# Patient Record
Sex: MALE | Race: WHITE | Employment: FULL TIME | URBAN - METROPOLITAN AREA
[De-identification: names, ages, dates, MRNs, and addresses within clinical notes are randomized per-mention and may not be internally consistent; named-entity substitution may affect disease eponyms.]

---

## 2020-06-15 ENCOUNTER — HOSPITAL ENCOUNTER (EMERGENCY)
Age: 35
Discharge: HOME | End: 2020-06-15
Payer: MEDICAID

## 2020-06-15 ENCOUNTER — APPOINTMENT (OUTPATIENT)
Dept: GENERAL RADIOLOGY | Age: 35
End: 2020-06-15
Payer: MEDICAID

## 2020-06-15 VITALS
OXYGEN SATURATION: 97 % | BODY MASS INDEX: 42.14 KG/M2 | HEART RATE: 92 BPM | SYSTOLIC BLOOD PRESSURE: 144 MMHG | HEIGHT: 71 IN | RESPIRATION RATE: 16 BRPM | TEMPERATURE: 98 F | DIASTOLIC BLOOD PRESSURE: 70 MMHG | WEIGHT: 301 LBS

## 2020-06-15 PROCEDURE — 74018 RADEX ABDOMEN 1 VIEW: CPT

## 2020-06-15 PROCEDURE — 99212 OFFICE O/P EST SF 10 MIN: CPT

## 2020-06-15 ASSESSMENT — PAIN DESCRIPTION - PAIN TYPE
TYPE: ACUTE PAIN
TYPE: ACUTE PAIN

## 2020-06-15 ASSESSMENT — PAIN DESCRIPTION - ORIENTATION
ORIENTATION: LEFT;MID
ORIENTATION: LEFT

## 2020-06-15 ASSESSMENT — PAIN DESCRIPTION - ONSET
ONSET: ON-GOING
ONSET: ON-GOING

## 2020-06-15 ASSESSMENT — PAIN DESCRIPTION - DESCRIPTORS
DESCRIPTORS: ACHING;CRAMPING
DESCRIPTORS: ACHING

## 2020-06-15 ASSESSMENT — PAIN SCALES - GENERAL
PAINLEVEL_OUTOF10: 5
PAINLEVEL_OUTOF10: 4

## 2020-06-15 ASSESSMENT — PAIN DESCRIPTION - PROGRESSION
CLINICAL_PROGRESSION: NOT CHANGED
CLINICAL_PROGRESSION: GRADUALLY IMPROVING

## 2020-06-15 ASSESSMENT — PAIN - FUNCTIONAL ASSESSMENT: PAIN_FUNCTIONAL_ASSESSMENT: 0-10

## 2020-06-15 ASSESSMENT — PAIN DESCRIPTION - FREQUENCY
FREQUENCY: INTERMITTENT
FREQUENCY: CONTINUOUS

## 2020-06-15 ASSESSMENT — PAIN DESCRIPTION - LOCATION
LOCATION: ABDOMEN;FLANK
LOCATION: ABDOMEN;FLANK

## 2021-12-27 ENCOUNTER — HOSPITAL ENCOUNTER (EMERGENCY)
Age: 36
Discharge: HOME | End: 2021-12-27
Payer: MEDICAID

## 2021-12-27 VITALS
HEART RATE: 91 BPM | WEIGHT: 315 LBS | DIASTOLIC BLOOD PRESSURE: 92 MMHG | TEMPERATURE: 97 F | OXYGEN SATURATION: 96 % | SYSTOLIC BLOOD PRESSURE: 134 MMHG | BODY MASS INDEX: 44.1 KG/M2 | RESPIRATION RATE: 20 BRPM | HEIGHT: 71 IN

## 2021-12-27 DIAGNOSIS — Z20.822 EXPOSURE TO COVID-19 VIRUS: ICD-10-CM

## 2021-12-27 DIAGNOSIS — J06.9 VIRAL URI: Primary | ICD-10-CM

## 2021-12-27 PROCEDURE — 99211 OFF/OP EST MAY X REQ PHY/QHP: CPT

## 2021-12-27 RX ORDER — GUAIFENESIN 600 MG/1
600 TABLET, EXTENDED RELEASE ORAL 2 TIMES DAILY
Qty: 10 TABLET | Refills: 0 | Status: SHIPPED | OUTPATIENT
Start: 2021-12-27 | End: 2022-01-01

## 2021-12-27 NOTE — Clinical Note
Mahesh Montanez was seen and treated in our emergency department on 12/27/2021. He may return to work on 12/31/2021. If Covid test is positive the patient will need to quarantine for 10 daysIf Covid test is negative patient may return to work December 31, 2021     If you have any questions or concerns, please don't hesitate to call.       Gino Molina, [address]

## 2021-12-27 NOTE — ED PROVIDER NOTES
[facility]  Department of Emergency Medicine   ED  Encounter Note  Admit Date/RoomTime: 2021  6:19 PM  ED Room:   NAME: Alex Cason  : 1985  MRN: [id number]     Chief Complaint:  Concern For COVID-19 (started friday with sinus pain was exposed to people at work who had covid) and Sinusitis    HISTORY OF PRESENT ILLNESS        Alex Cason is a 31 y.o. male who presents to the ED with a concern for Covid. Patient states he works at [employer]. They have had seven in patients that have been positive for Covid and he was exposed. Patient complains of not feeling well for the past 3 to 4 days. Patient complains of body aches. Sinus congestion. Cough. Patient denies fever. Patient denies loss of taste or loss of smell. Patient denies shortness of breath. Denies nausea, vomiting or diarrhea. Symptoms are moderate in severity. He is not taking any over-the-counter medications. ROS   Pertinent positives and negatives are stated within HPI, all other systems reviewed and are negative. Past Medical History:  has a past medical history of Atrial fibrillation ([facility] [place] [address] Surgical History:  has no past surgical history on file. Social History:  reports that he has never smoked. His smokeless tobacco use includes chew. He reports previous alcohol use. He reports previous drug use. Drug: Opiates . Family History: family history is not on file. Allergies: Bee venom    PHYSICAL EXAM   Oxygen Saturation Interpretation: Normal on room air analysis. ED Triage Vitals [21 1821]   BP Temp Temp Source Pulse Resp SpO2 Height Weight   (!) 134/92 97 °F (36.1 °C) Infrared 91 20 96 % 5' 11\" (1.803 m) (!) 317 lb (143.8 kg)       General:  NAD. Alert and Oriented. Well-appearing. Skin:  Warm, dry. No rashes. Head:  Normocephalic. Atraumatic. Eyes:  EOMI. Conjunctiva normal.  ENT:  Oral mucosa moist.  Airway patent.   Posterior pharynx pink without erythema, without swelling, without exudate. Uvula midline with equal rise and without edema. Clear postnasal drainage. Bilateral ear canals patent with minimal cerumen. Bilateral TM's without erythema, without bulging. Nasal turbinates with moderate swelling. Frontal and maxillary sinuses nontender to palpation. Neck:  Supple. Normal ROM. Respiratory:  No respiratory distress. No labored breathing. Lungs clear without rales, rhonchi or wheezing. Cardiovascular: Irregular heartbeat. No Murmur. No peripheral edema. Extremities warm and good color. Extremities:  Normal ROM. Nontender to palpation. Atraumatic. Back:  Normal ROM. Nontender to palpation. Neuro:  Alert and Oriented to person, place, time and situation. Normal LOC. Moves all extremities. Speech fluent. Psych:  Calm and Cooperative. Normal thought process. Normal judgement. Lab / Imaging Results   (All laboratory and radiology results have been personally reviewed by myself)  Labs:  No results found for this visit on 12/27/21. Imaging: All Radiology results interpreted by Radiologist unless otherwise noted. No orders to display       ED Course / Medical Decision Making   Medications - No data to display     Re-examination:  12/27/21       Time: On auscultation patient has an irregular heart beat. Does tell me he has A. fib. He is not on any medication for it. I told him he was crazy and that that was ridiculous. That he definitely needs to see his family doctor and get on an anticoagulant and rate control medication. Before he had a stroke. Consult(s):   None    Procedure(s):   none    MDM:   Did offer to do an outpatient Covid test on patient. He states he will just go to the rapid clinic tomorrow. With his history of A. fib, untreated, I advised him he should not take any medication with pseudoephedrine in it. I will write for a plain decongestant.   He understands if he has any worsening symptoms he should go to the emergency room. Plan of Care/Counseling:  Physician Assistant on duty reviewed today's visit with the patient in addition to providing specific details for the plan of care and counseling regarding the diagnosis and prognosis. Questions are answered at this time and are agreeable with the plan. ASSESSMENT     1. Viral URI New Problem   2. Exposure to COVID-19 virus New Problem     PLAN   Discharged home. Patient condition is good    New Medications     New Prescriptions    GUAIFENESIN (MUCINEX) 600 MG EXTENDED RELEASE TABLET    Take 1 tablet by mouth 2 times daily for 5 days     Electronically signed by SHA Wen   DD: 12/27/21  **This report was transcribed using voice recognition software. Every effort was made to ensure accuracy; however, inadvertent computerized transcription errors may be present.   END OF ED PROVIDER NOTE       Susana Wen  12/27/21 [id number]

## 2022-03-27 ENCOUNTER — APPOINTMENT (OUTPATIENT)
Dept: CT IMAGING | Age: 37
DRG: 383 | End: 2022-03-27
Payer: MEDICAID

## 2022-03-27 ENCOUNTER — HOSPITAL ENCOUNTER (INPATIENT)
Age: 37
LOS: 2 days | Discharge: HOME | DRG: 383 | End: 2022-03-29
Attending: EMERGENCY MEDICINE | Admitting: INTERNAL MEDICINE
Payer: MEDICAID

## 2022-03-27 ENCOUNTER — HOSPITAL ENCOUNTER (EMERGENCY)
Age: 37
Discharge: HOME | End: 2022-03-27
Payer: MEDICAID

## 2022-03-27 VITALS
OXYGEN SATURATION: 98 % | SYSTOLIC BLOOD PRESSURE: 157 MMHG | WEIGHT: 307 LBS | BODY MASS INDEX: 42.98 KG/M2 | HEIGHT: 71 IN | HEART RATE: 100 BPM | TEMPERATURE: 97.3 F | DIASTOLIC BLOOD PRESSURE: 98 MMHG | RESPIRATION RATE: 18 BRPM

## 2022-03-27 DIAGNOSIS — L02.01 FACIAL ABSCESS: Primary | ICD-10-CM

## 2022-03-27 DIAGNOSIS — L03.213 PRESEPTAL CELLULITIS: ICD-10-CM

## 2022-03-27 DIAGNOSIS — E66.01 CLASS 3 SEVERE OBESITY DUE TO EXCESS CALORIES WITH SERIOUS COMORBIDITY AND BODY MASS INDEX (BMI) OF 40.0 TO 44.9 IN ADULT (HCC): Primary | ICD-10-CM

## 2022-03-27 PROBLEM — F11.10 NARCOTIC ABUSE (HCC): Status: ACTIVE | Noted: 2022-03-27

## 2022-03-27 PROBLEM — G47.33 OSA (OBSTRUCTIVE SLEEP APNEA): Status: ACTIVE | Noted: 2022-03-27

## 2022-03-27 PROBLEM — L03.211 FACIAL CELLULITIS: Status: ACTIVE | Noted: 2022-03-27

## 2022-03-27 PROBLEM — F10.10 ALCOHOL ABUSE: Status: ACTIVE | Noted: 2022-03-27

## 2022-03-27 PROBLEM — K08.9 POOR DENTITION: Status: ACTIVE | Noted: 2022-03-27

## 2022-03-27 PROBLEM — I10 HTN (HYPERTENSION): Status: ACTIVE | Noted: 2022-03-27

## 2022-03-27 PROBLEM — R22.0 FACIAL SWELLING: Status: ACTIVE | Noted: 2022-03-27

## 2022-03-27 PROBLEM — E66.813 CLASS 3 SEVERE OBESITY DUE TO EXCESS CALORIES IN ADULT (HCC): Status: ACTIVE | Noted: 2022-03-27

## 2022-03-27 PROBLEM — I48.91 A-FIB (HCC): Status: ACTIVE | Noted: 2022-03-27

## 2022-03-27 PROBLEM — Z72.0 CHEWS TOBACCO REGULARLY: Status: ACTIVE | Noted: 2022-03-27

## 2022-03-27 LAB
ANION GAP SERPL CALCULATED.3IONS-SCNC: 15 MMOL/L (ref 7–16)
BASOPHILS ABSOLUTE: 0.01 E9/L (ref 0–0.2)
BASOPHILS RELATIVE PERCENT: 0.1 % (ref 0–2)
BUN BLDV-MCNC: 10 MG/DL (ref 6–20)
CALCIUM SERPL-MCNC: 9.3 MG/DL (ref 8.6–10.2)
CHLORIDE BLD-SCNC: 104 MMOL/L (ref 98–107)
CO2: 23 MMOL/L (ref 22–29)
CREAT SERPL-MCNC: 0.7 MG/DL (ref 0.7–1.2)
EOSINOPHILS ABSOLUTE: 0 E9/L (ref 0.05–0.5)
EOSINOPHILS RELATIVE PERCENT: 0 % (ref 0–6)
GFR AFRICAN AMERICAN: >60
GFR NON-AFRICAN AMERICAN: >60 ML/MIN/1.73
GLUCOSE BLD-MCNC: 116 MG/DL (ref 74–99)
HCT VFR BLD CALC: 42.5 % (ref 37–54)
HEMOGLOBIN: 14.5 G/DL (ref 12.5–16.5)
IMMATURE GRANULOCYTES #: 0.06 E9/L
IMMATURE GRANULOCYTES %: 0.5 % (ref 0–5)
LACTIC ACID: 1.3 MMOL/L (ref 0.5–2.2)
LYMPHOCYTES ABSOLUTE: 1.97 E9/L (ref 1.5–4)
LYMPHOCYTES RELATIVE PERCENT: 15.7 % (ref 20–42)
MCH RBC QN AUTO: 30.9 PG (ref 26–35)
MCHC RBC AUTO-ENTMCNC: 34.1 % (ref 32–34.5)
MCV RBC AUTO: 90.4 FL (ref 80–99.9)
MONOCYTES ABSOLUTE: 1.01 E9/L (ref 0.1–0.95)
MONOCYTES RELATIVE PERCENT: 8.1 % (ref 2–12)
NEUTROPHILS ABSOLUTE: 9.48 E9/L (ref 1.8–7.3)
NEUTROPHILS RELATIVE PERCENT: 75.6 % (ref 43–80)
PDW BLD-RTO: 13.2 FL (ref 11.5–15)
PLATELET # BLD: 269 E9/L (ref 130–450)
PMV BLD AUTO: 9.9 FL (ref 7–12)
POTASSIUM REFLEX MAGNESIUM: 3.8 MMOL/L (ref 3.5–5)
RBC # BLD: 4.7 E12/L (ref 3.8–5.8)
SODIUM BLD-SCNC: 142 MMOL/L (ref 132–146)
WBC # BLD: 12.5 E9/L (ref 4.5–11.5)

## 2022-03-27 PROCEDURE — 99211 OFF/OP EST MAY X REQ PHY/QHP: CPT

## 2022-03-27 PROCEDURE — 2580000003 HC RX 258: Performed by: INTERNAL MEDICINE

## 2022-03-27 PROCEDURE — 96365 THER/PROPH/DIAG IV INF INIT: CPT

## 2022-03-27 PROCEDURE — 6370000000 HC RX 637 (ALT 250 FOR IP): Performed by: INTERNAL MEDICINE

## 2022-03-27 PROCEDURE — 83605 ASSAY OF LACTIC ACID: CPT

## 2022-03-27 PROCEDURE — 87040 BLOOD CULTURE FOR BACTERIA: CPT

## 2022-03-27 PROCEDURE — 6360000004 HC RX CONTRAST MEDICATION: Performed by: STUDENT IN AN ORGANIZED HEALTH CARE EDUCATION/TRAINING PROGRAM

## 2022-03-27 PROCEDURE — 36415 COLL VENOUS BLD VENIPUNCTURE: CPT

## 2022-03-27 PROCEDURE — 87081 CULTURE SCREEN ONLY: CPT

## 2022-03-27 PROCEDURE — 1200000000 HC SEMI PRIVATE

## 2022-03-27 PROCEDURE — 99285 EMERGENCY DEPT VISIT HI MDM: CPT

## 2022-03-27 PROCEDURE — 80048 BASIC METABOLIC PNL TOTAL CA: CPT

## 2022-03-27 PROCEDURE — 2580000003 HC RX 258: Performed by: STUDENT IN AN ORGANIZED HEALTH CARE EDUCATION/TRAINING PROGRAM

## 2022-03-27 PROCEDURE — 6360000002 HC RX W HCPCS: Performed by: INTERNAL MEDICINE

## 2022-03-27 PROCEDURE — 2580000003 HC RX 258: Performed by: PHYSICIAN ASSISTANT

## 2022-03-27 PROCEDURE — 70487 CT MAXILLOFACIAL W/DYE: CPT

## 2022-03-27 PROCEDURE — 6360000002 HC RX W HCPCS: Performed by: PHYSICIAN ASSISTANT

## 2022-03-27 PROCEDURE — 96375 TX/PRO/DX INJ NEW DRUG ADDON: CPT

## 2022-03-27 PROCEDURE — 85025 COMPLETE CBC W/AUTO DIFF WBC: CPT

## 2022-03-27 RX ORDER — SODIUM CHLORIDE 0.9 % (FLUSH) 0.9 %
5-40 SYRINGE (ML) INJECTION EVERY 12 HOURS SCHEDULED
Status: DISCONTINUED | OUTPATIENT
Start: 2022-03-27 | End: 2022-03-29 | Stop reason: HOSPADM

## 2022-03-27 RX ORDER — ACETAMINOPHEN 500 MG
1000 TABLET ORAL 4 TIMES DAILY
Status: DISCONTINUED | OUTPATIENT
Start: 2022-03-27 | End: 2022-03-29 | Stop reason: HOSPADM

## 2022-03-27 RX ORDER — ASPIRIN 81 MG/1
81 TABLET ORAL DAILY
Status: DISCONTINUED | OUTPATIENT
Start: 2022-03-27 | End: 2022-03-29 | Stop reason: HOSPADM

## 2022-03-27 RX ORDER — DEXAMETHASONE SODIUM PHOSPHATE 10 MG/ML
10 INJECTION INTRAMUSCULAR; INTRAVENOUS ONCE
Status: COMPLETED | OUTPATIENT
Start: 2022-03-27 | End: 2022-03-27

## 2022-03-27 RX ORDER — FAMOTIDINE 20 MG/1
20 TABLET, FILM COATED ORAL 2 TIMES DAILY
Status: DISCONTINUED | OUTPATIENT
Start: 2022-03-27 | End: 2022-03-29 | Stop reason: HOSPADM

## 2022-03-27 RX ORDER — CARVEDILOL 6.25 MG/1
6.25 TABLET ORAL 2 TIMES DAILY WITH MEALS
Status: DISCONTINUED | OUTPATIENT
Start: 2022-03-27 | End: 2022-03-28

## 2022-03-27 RX ORDER — DEXAMETHASONE SODIUM PHOSPHATE 4 MG/ML
4 INJECTION, SOLUTION INTRA-ARTICULAR; INTRALESIONAL; INTRAMUSCULAR; INTRAVENOUS; SOFT TISSUE EVERY 6 HOURS
Status: DISCONTINUED | OUTPATIENT
Start: 2022-03-27 | End: 2022-03-29 | Stop reason: HOSPADM

## 2022-03-27 RX ORDER — ONDANSETRON 4 MG/1
4 TABLET, ORALLY DISINTEGRATING ORAL EVERY 8 HOURS PRN
Status: DISCONTINUED | OUTPATIENT
Start: 2022-03-27 | End: 2022-03-29 | Stop reason: HOSPADM

## 2022-03-27 RX ORDER — SODIUM CHLORIDE 9 MG/ML
25 INJECTION, SOLUTION INTRAVENOUS PRN
Status: DISCONTINUED | OUTPATIENT
Start: 2022-03-27 | End: 2022-03-29 | Stop reason: HOSPADM

## 2022-03-27 RX ORDER — SODIUM CHLORIDE 0.9 % (FLUSH) 0.9 %
10 SYRINGE (ML) INJECTION
Status: COMPLETED | OUTPATIENT
Start: 2022-03-27 | End: 2022-03-27

## 2022-03-27 RX ORDER — LACTOBACILLUS RHAMNOSUS GG 10B CELL
1 CAPSULE ORAL 2 TIMES DAILY
Status: DISCONTINUED | OUTPATIENT
Start: 2022-03-27 | End: 2022-03-29 | Stop reason: HOSPADM

## 2022-03-27 RX ORDER — SODIUM CHLORIDE 0.9 % (FLUSH) 0.9 %
5-40 SYRINGE (ML) INJECTION PRN
Status: DISCONTINUED | OUTPATIENT
Start: 2022-03-27 | End: 2022-03-29 | Stop reason: HOSPADM

## 2022-03-27 RX ORDER — 0.9 % SODIUM CHLORIDE 0.9 %
1000 INTRAVENOUS SOLUTION INTRAVENOUS ONCE
Status: COMPLETED | OUTPATIENT
Start: 2022-03-27 | End: 2022-03-27

## 2022-03-27 RX ORDER — CLONIDINE HYDROCHLORIDE 0.1 MG/1
0.1 TABLET ORAL EVERY 4 HOURS PRN
Status: DISCONTINUED | OUTPATIENT
Start: 2022-03-27 | End: 2022-03-29 | Stop reason: HOSPADM

## 2022-03-27 RX ORDER — ONDANSETRON 2 MG/ML
4 INJECTION INTRAMUSCULAR; INTRAVENOUS EVERY 6 HOURS PRN
Status: DISCONTINUED | OUTPATIENT
Start: 2022-03-27 | End: 2022-03-29 | Stop reason: HOSPADM

## 2022-03-27 RX ADMIN — Medication 10 ML: at 12:00

## 2022-03-27 RX ADMIN — ASPIRIN 81 MG: 81 TABLET, COATED ORAL at 14:42

## 2022-03-27 RX ADMIN — Medication 1 CAPSULE: at 22:30

## 2022-03-27 RX ADMIN — SODIUM CHLORIDE 1000 ML: 9 INJECTION, SOLUTION INTRAVENOUS at 10:50

## 2022-03-27 RX ADMIN — DEXAMETHASONE SODIUM PHOSPHATE 4 MG: 4 INJECTION, SOLUTION INTRAMUSCULAR; INTRAVENOUS at 22:30

## 2022-03-27 RX ADMIN — SODIUM CHLORIDE 3000 MG: 900 INJECTION INTRAVENOUS at 10:49

## 2022-03-27 RX ADMIN — Medication 10 ML: at 22:32

## 2022-03-27 RX ADMIN — ENOXAPARIN SODIUM 30 MG: 100 INJECTION SUBCUTANEOUS at 14:55

## 2022-03-27 RX ADMIN — SODIUM CHLORIDE 3000 MG: 900 INJECTION INTRAVENOUS at 19:28

## 2022-03-27 RX ADMIN — VANCOMYCIN HYDROCHLORIDE 2750 MG: 1 INJECTION, POWDER, LYOPHILIZED, FOR SOLUTION INTRAVENOUS at 14:42

## 2022-03-27 RX ADMIN — ACETAMINOPHEN 1000 MG: 500 TABLET ORAL at 17:56

## 2022-03-27 RX ADMIN — FAMOTIDINE 20 MG: 20 TABLET ORAL at 22:30

## 2022-03-27 RX ADMIN — DEXAMETHASONE SODIUM PHOSPHATE 4 MG: 4 INJECTION, SOLUTION INTRAMUSCULAR; INTRAVENOUS at 14:43

## 2022-03-27 RX ADMIN — DEXAMETHASONE SODIUM PHOSPHATE 10 MG: 10 INJECTION INTRAMUSCULAR; INTRAVENOUS at 10:49

## 2022-03-27 RX ADMIN — IOPAMIDOL 90 ML: 755 INJECTION, SOLUTION INTRAVENOUS at 12:00

## 2022-03-27 RX ADMIN — CARVEDILOL 6.25 MG: 6.25 TABLET, FILM COATED ORAL at 17:56

## 2022-03-27 RX ADMIN — SODIUM CHLORIDE 3000 MG: 900 INJECTION INTRAVENOUS at 22:31

## 2022-03-27 RX ADMIN — FAMOTIDINE 20 MG: 20 TABLET ORAL at 14:55

## 2022-03-27 RX ADMIN — Medication 1 CAPSULE: at 14:42

## 2022-03-27 RX ADMIN — ACETAMINOPHEN 1000 MG: 500 TABLET ORAL at 22:30

## 2022-03-27 ASSESSMENT — PAIN SCALES - GENERAL
PAINLEVEL_OUTOF10: 2
PAINLEVEL_OUTOF10: 0
PAINLEVEL_OUTOF10: 3
PAINLEVEL_OUTOF10: 5

## 2022-03-27 ASSESSMENT — ENCOUNTER SYMPTOMS
CHEST TIGHTNESS: 0
COUGH: 0
FACIAL SWELLING: 1
TROUBLE SWALLOWING: 0
SHORTNESS OF BREATH: 0
COLOR CHANGE: 0
SORE THROAT: 0

## 2022-03-27 ASSESSMENT — PAIN DESCRIPTION - LOCATION: LOCATION: TEETH

## 2022-03-27 NOTE — PROGRESS NOTES
Dr. Sommer Pettit MD,    Your patient is on a medication that requires a renal and/or weight dose adjustment. Renal/Body Weight Function Assessment:    Date Body Weight IBW  Adjusted BW SCr  CrCl Dialysis status   3/27/2022 139.3 kg  Ideal body weight: 75.3 kg (166 lb 0.1 oz)  Adjusted ideal body weight: 100.9 kg (222 lb 6.5 oz) Serum creatinine: 0.7 mg/dL 03/27/22 1022  Estimated creatinine clearance: 210 mL/min N/a       Pharmacy has dose-adjusted the following medication(s):    Date Previous Order Adjusted Order   3/27/2022 Enoxaparin 40 mg daily Enoxaparin 30 mg BID       These changes were made per protocol according to the [facility] Renal Dosing Policy/ [facility] Pharmacist Anticoagulant Review. *Please note this dose may need readjusted if your patient's condition changes. Please contact pharmacy with any questions regarding these changes.     [address], [facility]  3/27/2022  1:46 PM

## 2022-03-27 NOTE — H&P
PCP: Kamila Davila DO     Chief Complaint: facial cellulitis    HPI:   Pavan Condon is a 30y.o. year old White (non-) male with PMH HTN and afib not on meds presented to ED due to facial swelling. He has poor denition and in Jan/Feb he had some teeth pulled out. On [address] he started to feel some tooth pain and called and made an appointment with his dentist of April 4th, the first available appt. His tooth pain progressed and started to have drainage from his L eye and nose this morning along with facial swelling and could not see out of L eye and so he called his mother and she took him to [address] [facility] [address] [place] [address] urgent [facility] and then her was referred here to [facility] for further mx. CT head did shows extensive cellulitis but does not show any abscess  His labs essentially unremarklable      Past Medical History:   Past Medical History:   Diagnosis Date    Atrial fibrillation ([address]     Hypertension         Past Surgical History:   Teeth extraction    Family History:   History reviewed. No pertinent family history.      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\"     Sexual activity: Yes   Other Topics Concern    None   Social History Narrative    None     Social Determinants of Health     Financial Resource Strain:     Difficulty of Paying Living Expenses: Not on file   Food Insecurity:     Worried About Running Out of Food in the Last Year: Not on file    John of Food in the Last Year: Not on file   Transportation Needs:     Lack of Transportation (Medical): Not on file    Lack of Transportation (Non-Medical): Not on file   Physical Activity:     Days of Exercise per Week: Not on file    Minutes of Exercise per Session: Not on file   Stress:     Feeling of Stress : Not on file   Social Connections:     Frequency of Communication with Friends and Family: Not on file    Frequency of Social Gatherings with Friends and Family: Not on file    Attends Yarsanism Services: Not on file    Active Member of [address] [place] [address] [employer] or Organizations: Not on file    Attends Club or Organization Meetings: Not on file    Marital Status: Not on file   Intimate Partner Violence:     Fear of Current or Ex-Partner: Not on file    Emotionally Abused: Not on file    Physically Abused: Not on file    Sexually Abused: Not on file   Housing Stability:     Unable to Pay for Housing in the Last Year: Not on file    Number of [place] in the Last Year: Not on file    Unstable Housing in the Last Year: Not on file    lives with 15 y/o son    Home Medications:   Prior to Admission medications    Not on File        Allergies:    Allergies   Allergen Reactions    Bee Venom     Dilantin [Phenytoin] Hives        Review of Systems:  Constitutional - Normal Appetite, Stable weight, No Fever, Chills, weakness, Fatigue   HEENT - No HA, hearing changes, seasonal allergies   Respiratory - no cough, wheezing, asthma, hemoptysis   Cardiac - No Chest pain, SOB, palpitations, diaphoresis, [place], syncope, dizziness   GI - No Abdominal pain, nausea, vomiting, diarrhea, constipation    -No Urinary symptoms, dysuria, hematuria   Neurological - No dysphagia, dysarthria, numbness, weakness   Musculoskeletal - No joint pain or tenderness, muscle pain, edema   Skin - No rash, lesions   Psych - No anxiety, depression, stress, insomnia     Physical Exam:   BP (!) 166/91   Pulse 100   Temp 98.4 °F (36.9 °C) (Oral)   Resp 18   SpO2 96%      General appearance: comfortable, in NAD   Head: Normocephalic, with significant swelling over L orbit and L eyes hard to open and swelling over L mandible. Mouth: eroded dentition but no discharge or foul smell  Neck: Supple, no lymphadenopathy or thyromegaly, trachea midline   Lungs: CTA, no wheezing or crackles   Heart: Regular rate and rhythm, S1, S2 normal, no murmur, click, rub or gallop   Abdomen: Soft, non-tender and not-distended. Bowel sounds normal. No masses, no organomegaly   Extremities: Extremities normal, atraumatic, no cyanosis or edema   Skin: Skin color, texture, turgor normal. No rashes or lesions   Neurologic: AAOx3, CN I-XII intact ,muscle tone and power, normal sensation     I/O  No intake or output data in the 24 hours ending 03/27/22 1410    Labs:   Recent Labs     03/27/22  1022   WBC 12.5*   HGB 14.5   HCT 42.5          Recent Labs     03/27/22  1022      K 3.8      CO2 23   BUN 10   CREATININE 0.7   CALCIUM 9.3       No results for input(s): PROT, ALB, ALKPHOS, ALT, AST, BILITOT, AMYLASE, LIPASE in the last 72 hours. No results for input(s): INR in the last 72 hours. No results for input(s): [place] in the last 72 hours. Chronic labs:  No results found for: CHOL, TRIG, HDL, LDLCALC, TSH, PSA, INR, LABA1C    Radiology:  Imaging studies reviewed today.      Assessment/Plan:    Patient Active Problem List   Diagnosis    Facial swelling    Poor dentition    Facial cellulitis    HTN (hypertension)    [facility] [address]    MAYE (obstructive sleep apnea)    Class 3 severe obesity due to excess calories in adult [facility])    Remote history of Narcotic abuse ([address]    Chews tobacco regularly    Remote history of Alcohol abuse      Severe facial cellulitis 2/2 very poor dentition   Pt has appt with dentistry 4/4/22  MRSA scrn  IV Unasyn, vancomycin  Encouraged to sleep on his back  IV decadron  Pepcid  Diet; soft and small bites  ID input    Hx/o HTN  Pt has not been on BP meds for a long time. Hs been on Lisinopril in the past  Prn clonidine for now    Hx/o afib  Not on meds  Start ASA    Suspect MAYE  Ordered OP PSG    Morbid Obesity, class III, BMI 42    Chews tobacco since age 17 y/o  Counseled on cessation    Remote hx/o narcotic and alcohol abuse  Sober for >3 yrs    DVT px: Lovenox  Code status: Full code     Plan discharge home pend coarse    CT:  Impression   1. Findings consistent with extensive facial cellulitis over the maxilla,   extending across the midline but more to the left and extending up to the   level of the orbits with left orbital preseptal cellulitis. 2. No evidence of an abscess. 3. No evidence of intra orbital abscess or postseptal cellulitis. 4. Poor visualization of the supraglottic structures including the   epiglottis, valleculae, piriform sinuses and aryepiglottic folds.  The   significance of this is uncertain and may be artifactual.  The possibility of   inflammatory process extending to the supraglottic region cannot be excluded.        Gabbie Hernandez MD   03/27/22, 2:10 [id number] [address]

## 2022-03-27 NOTE — PROGRESS NOTES
Pharmacy Consultation Note  (Antibiotic Dosing and Monitoring)    Initial consult date: 3/27/22  Consulting physician/provider:Charlie  Drug: Vancomycin  Indication: Head and neck infection    Age/  Gender Height Weight IBW  Allergy Information   37 y.o./male         Ideal body weight: 75.3 kg (166 lb 0.1 oz)  Adjusted ideal body weight: 100.9 kg (222 lb 6.5 oz)   Bee venom and Dilantin [phenytoin]      Renal Function:  Recent Labs     03/27/22  1022   BUN 10   CREATININE 0.7     No intake or output data in the 24 hours ending 03/27/22 1406    Vancomycin Monitoring:  Trough:  No results for input(s): VANCOTROUGH in the last 72 hours. Random:  No results for input(s): VANCORANDOM in the last 72 hours. Vancomycin Administration Times:  Recent vancomycin administrations      No vancomycin IV orders with administrations found. Orders not given:          vancomycin (VANCOCIN) 2,750 mg in dextrose 5 % 600 mL IVPB    vancomycin (VANCOCIN) 2,000 mg in dextrose 5 % 500 mL IVPB                Assessment:  · Patient is a 30 y.o. male who has been initiated on vancomycin  · Estimated Creatinine Clearance: 210 mL/min (based on SCr of 0.7 mg/dL). · To dose vancomycin, pharmacy will be utilizing "[employer] calculation software for goal AUC/NANY 400-600 mg/L-hr    Plan:  · Received 2.75g x 1 dose in ED.  Will continue vancomycin 2g IV every 12 hours  · Will check vancomycin levels when appropriate  · Will continue to monitor renal function   · Clinical pharmacy to follow      Kierra Benites [facility] 3/27/2022 2:06 PM

## 2022-03-27 NOTE — ED PROVIDER NOTES
ED Attending shared visit  CC: No        Department of Emergency Medicine   ED  Provider Note  Admit Date/RoomTime: 3/27/2022 10:08 AM  ED Room: 17B/17B-17  HPI:  3/27/22, Time: 12:00 PM EDT      Patient is a 72-year-old male presenting to the emergency department with left-sided dental pain and significant swelling to the left side of his face. Patient states the dental pain has been going on for several days. The pain in his tooth actually improved last night but he woke up this morning with significant swelling to his cheek and around his eye. Patient states he is having hard time opening his left eye. He has an appointment in 1 week to have all of his upper teeth removed on the left side. Patient sent to ED from urgent care for further evaluation. He denies any drainage from the mouth, difficulty swallowing or breathing, fevers or chills, myalgias, nausea/vomiting. The history is provided by the patient. No  was used. REVIEW OF SYSTEMS:  Review of Systems   Constitutional: Negative for activity change, chills, fatigue and fever. HENT: Positive for dental problem and facial swelling. Negative for ear discharge, ear pain, mouth sores, sore throat and trouble swallowing. Respiratory: Negative for cough, chest tightness and shortness of breath. Cardiovascular: Negative for chest pain, palpitations and leg swelling. Musculoskeletal: Negative for myalgias, neck pain and neck stiffness. Skin: Negative for color change, pallor and rash. Neurological: Negative for dizziness, weakness, light-headedness and headaches. Psychiatric/Behavioral: Negative for agitation, behavioral problems and confusion.       Pertinent positives and negatives are stated within HPI, all other systems reviewed and are negative.      --------------------------------------------- PAST HISTORY ---------------------------------------------  Past Medical History:  has a past medical history of Atrial fibrillation ([address] and Hypertension. Past Surgical History:  has no past surgical history on file. Social History:  reports that he has never smoked. His smokeless tobacco use includes chew. He reports previous alcohol use. He reports previous drug use. Drug: Opiates . Family History: family history is not on file. The patients home medications have been reviewed.     Allergies: Bee venom and Dilantin [phenytoin]    -------------------------------------------------- RESULTS -------------------------------------------------  All laboratory and radiology results have been personally reviewed by myself   LABS:  Results for orders placed or performed during the hospital encounter of 03/27/22   CBC with Auto Differential   Result Value Ref Range    WBC 12.5 (H) 4.5 - 11.5 E9/L    RBC 4.70 3.80 - 5.80 E12/L    Hemoglobin 14.5 12.5 - 16.5 g/dL    Hematocrit 42.5 37.0 - 54.0 %    MCV 90.4 80.0 - 99.9 fL    MCH 30.9 26.0 - 35.0 pg    MCHC 34.1 32.0 - 34.5 %    RDW 13.2 11.5 - 15.0 fL    Platelets 761 582 - 719 E9/L    MPV 9.9 7.0 - 12.0 fL    Neutrophils % 75.6 43.0 - 80.0 %    Immature Granulocytes % 0.5 0.0 - 5.0 %    Lymphocytes % 15.7 (L) 20.0 - 42.0 %    Monocytes % 8.1 2.0 - 12.0 %    Eosinophils % 0.0 0.0 - 6.0 %    Basophils % 0.1 0.0 - 2.0 %    Neutrophils Absolute 9.48 (H) 1.80 - 7.30 E9/L    Immature Granulocytes # 0.06 E9/L    Lymphocytes Absolute 1.97 1.50 - 4.00 E9/L    Monocytes Absolute 1.01 (H) 0.10 - 0.95 E9/L    Eosinophils Absolute 0.00 (L) 0.05 - 0.50 E9/L    Basophils Absolute 0.01 0.00 - 0.20 C1/D   Basic Metabolic Panel w/ Reflex to MG   Result Value Ref Range    Sodium 142 132 - 146 mmol/L    Potassium reflex Magnesium 3.8 3.5 - 5.0 mmol/L    Chloride 104 98 - 107 mmol/L    CO2 23 22 - 29 mmol/L    Anion Gap 15 7 - 16 mmol/L    Glucose 116 (H) 74 - 99 mg/dL    BUN 10 6 - 20 mg/dL    CREATININE 0.7 0.7 - 1.2 mg/dL    GFR Non-African American >60 >=60 mL/min/1.73    GFR African American >60     Calcium 9.3 8.6 - 10.2 mg/dL   Lactic Acid   Result Value Ref Range    Lactic Acid 1.3 0.5 - 2.2 mmol/L       RADIOLOGY:  Interpreted by Radiologist.  [address]   Final Result   1. Findings consistent with extensive facial cellulitis over the maxilla,   extending across the midline but more to the left and extending up to the   level of the orbits with left orbital preseptal cellulitis. 2. No evidence of an abscess. 3. No evidence of intra orbital abscess or postseptal cellulitis. 4. Poor visualization of the supraglottic structures including the   epiglottis, valleculae, piriform sinuses and aryepiglottic folds. The   significance of this is uncertain and may be artifactual.  The possibility of   inflammatory process extending to the supraglottic region cannot be excluded. ------------------------- NURSING NOTES AND VITALS REVIEWED ---------------------------   The nursing notes within the ED encounter and vital signs as below have been reviewed. BP (!) 166/91   Pulse 100   Temp 98.4 °F (36.9 °C) (Oral)   Resp 18   SpO2 96%   Oxygen Saturation Interpretation: Normal      ---------------------------------------------------PHYSICAL EXAM--------------------------------------    Physical Exam  Vitals and nursing note reviewed. Constitutional:       General: He is not in acute distress. Appearance: Normal appearance. He is well-developed. HENT:      Head: Normocephalic and atraumatic. Mouth/Throat:      Mouth: Mucous membranes are moist.      Comments: Overall poor dentition with significant dental decay. No obvious periapical abscess on the left side. Tenderness and edema over the left maxillay area. No abscess palpated. No submental TTP or edema. Eyes:      Comments: Moderate edema and erythema around left eye with difficulty opening the eye. Cardiovascular:      Rate and Rhythm: Normal rate and regular rhythm.       Heart sounds: Normal heart sounds. No murmur heard. Pulmonary:      Effort: Pulmonary effort is normal. No respiratory distress. Breath sounds: Normal breath sounds. Musculoskeletal:         General: No swelling, tenderness or deformity. Normal range of motion. Cervical back: Normal range of motion and neck supple. No rigidity. Skin:     General: Skin is warm and dry. Capillary Refill: Capillary refill takes less than 2 seconds. Findings: No erythema. Neurological:      General: No focal deficit present. Mental Status: He is alert and oriented to person, place, and time. Mental status is at baseline. Coordination: Coordination normal.   Psychiatric:         Mood and Affect: Mood normal.         Behavior: Behavior normal.         Thought Content:  Thought content normal.            ------------------------------ ED COURSE/MEDICAL DECISION MAKING----------------------  Medications   vancomycin (VANCOCIN) 2,750 mg in dextrose 5 % 600 mL IVPB (has no administration in time range)   ampicillin-sulbactam (UNASYN) 3000 mg ivpb minibag (has no administration in time range)   lactobacillus (CULTURELLE) capsule 1 capsule (has no administration in time range)   acetaminophen (TYLENOL) tablet 1,000 mg (has no administration in time range)   ondansetron (ZOFRAN) injection 4 mg (has no administration in time range)   ondansetron (ZOFRAN-ODT) disintegrating tablet 4 mg (has no administration in time range)   sodium chloride flush 0.9 % injection 5-40 mL (has no administration in time range)   sodium chloride flush 0.9 % injection 5-40 mL (has no administration in time range)   0.9 % sodium chloride infusion (has no administration in time range)   enoxaparin (LOVENOX) injection 30 mg (has no administration in time range)   sennosides (SENOKOT) 8.8 MG/5ML syrup 5 mL (has no administration in time range)   cloNIDine (CATAPRES) tablet 0.1 mg (has no administration in time range)   aspirin EC tablet 81 mg (has no administration in time range)   dexamethasone (DECADRON) injection 4 mg (has no administration in time range)   famotidine (PEPCID) tablet 20 mg (has no administration in time range)   vancomycin (VANCOCIN) 2,000 mg in dextrose 5 % 500 mL IVPB (has no administration in time range)   0.9 % sodium chloride bolus (1,000 mLs IntraVENous New Bag 3/27/22 1050)   dexamethasone (DECADRON) injection 10 mg (10 mg IntraVENous Given 3/27/22 1049)   ampicillin-sulbactam (UNASYN) 3000 mg ivpb minibag (0 mg IntraVENous Stopped 3/27/22 1129)   sodium chloride flush 0.9 % injection 10 mL (10 mLs IntraVENous Given 3/27/22 1200)   iopamidol (ISOVUE-370) 76 % injection 90 mL (90 mLs IntraVENous Given 3/27/22 1200)         ED COURSE:     CT FACIAL BONES W CONTRAST   Final Result   1. Findings consistent with extensive facial cellulitis over the maxilla,   extending across the midline but more to the left and extending up to the   level of the orbits with left orbital preseptal cellulitis. 2. No evidence of an abscess. 3. No evidence of intra orbital abscess or postseptal cellulitis. 4. Poor visualization of the supraglottic structures including the   epiglottis, valleculae, piriform sinuses and aryepiglottic folds. The   significance of this is uncertain and may be artifactual.  The possibility of   inflammatory process extending to the supraglottic region cannot be excluded. Procedures:  Procedures     Medical Decision Making:   MDM   72-year-old male presenting to the ED with dental pain, abscess and significant facial swelling. Patient appears to have very poor dentition but no obvious signs of any dental abscess. He does have moderate facial edema including around the eye consistent with preseptal/periorbital cellulitis. He has a mild leukocytosis of 12.5. Labs otherwise unremarkable. Blood cultures obtained and patient started on Decadron and Unasyn. CT does not show any drainable abscess.   He does have extensive cellulitis and edema over the maxilla and up to the orbit. Patient was initially given IV fluids, Decadron and Unasyn due to concerns for oral etiology. He is also given a dose of vancomycin for the underlying cellulitis. Patient will be admitted to [facility] physicians for further treatment and management. Counseling: The emergency provider has spoken with the patient and discussed todays results, in addition to providing specific details for the plan of care and counseling regarding the diagnosis and prognosis. Questions are answered at this time and they are agreeable with the plan.      --------------------------------- IMPRESSION AND DISPOSITION ---------------------------------    IMPRESSION  1. Class 3 severe obesity due to excess calories with serious comorbidity and body mass index (BMI) of 40.0 to 44.9 in adult [facility])    2. Preseptal cellulitis        DISPOSITION  Disposition: Admit to med/surg floor  Patient condition is good      Electronically signed by Pennie Bailon PA-C   DD: 3/27/22  **This report was transcribed using voice recognition software. Every effort was made to ensure accuracy; however, inadvertent computerized transcription errors may be present. END OF ED PROVIDER NOTE          Pennie Bailon PA-C  03/27/22 3687    ATTENDING PROVIDER ATTESTATION:     Ila Galindo presented to the emergency department for evaluation of Abscess (dental abscess overnight. Was sent here from urgent care in [facility]. )    I have reviewed and discussed the case, including pertinent history (medical, surgical, family and social) and exam findings with the Midlevel and the Nurse assigned to Ila Galindo. I have personally performed and/or participated in the history, exam, medical decision making, and procedures and agree with all pertinent clinical information.        I have reviewed my findings and recommendations with Ila Galindo and members of family present at the time of disposition. My findings/plan: The primary encounter diagnosis was Class 3 severe obesity due to excess calories with serious comorbidity and body mass index (BMI) of 40.0 to 44.9 in [facility]). A diagnosis of Preseptal cellulitis was also pertinent to this visit.   Current Discharge Medication List        Bryce Herring MD    Critical Care:  No       Bryce Herring MD  03/29/22 0373

## 2022-03-27 NOTE — ED NOTES
Patient ambulated to restroom, no complaints from patient, tolerated well. Patient denied any dizziness or shortness of breath.      Johann Thomas RN  03/27/22 9717

## 2022-03-27 NOTE — ED PROVIDER NOTES
Department of Emergency Medicine   [facility]  Provider Note  Admit Date/RoomTime: 3/27/2022  8:54 AM  Room:       NAME: Corie Conway  : 1985  MRN: [id number]     Chief Complaint:  Facial Swelling (left side   infected tooth left side upper front  eye swollen shout and redden started wed tooth started hurting   )    History of Present Illness        Corie Conway is a 30 y.o. old male who presents to the [facility] by private vehicle, for non-traumatic left upper canine and premolar pain, which occured 5 day(s) prior to arrival.  Since onset the symptoms have been worsening and moderate to severe in severity. Worsened by  heat and cold and improved by nothing. Associated Signs & Symptoms:  Facial swelling, redness and eye swelling. Onset:       Spontaneous:   yes. Following Trauma:   no.     Previous Caries:   yes. Recent Dental Procedure:   no.     ROS   Pertinent positives and negatives are stated within HPI, all other systems reviewed and are negative. Past Medical History:  has a past medical history of Atrial fibrillation ([facility] [address] Surgical History:  has no past surgical history on file. Social History:  reports that he has never smoked. His smokeless tobacco use includes chew. He reports previous alcohol use. He reports previous drug use. Drug: Opiates . Family History: family history is not on file. Allergies: Bee venom    Physical Exam           ED Triage Vitals   BP Temp Temp src Pulse Resp SpO2 Height Weight   22 0909 22 0858 -- 22 0858 22 0858 22 0858 22 0858 22 0858   (!) 157/98 97.3 °F (36.3 °C)  100 18 98 % 5' 11\" (1.803 m) (!) 307 lb (139.3 kg)      Oxygen Saturation Interpretation: Normal.    Constitutional:  Alert, development consistent with age. HEENT:  NC/NT. Airway patent. Edema and erythema to upper eyelid with ptosis  Neck:  Supple. Normal ROM.   Lips:  upper red, swollen. Mouth:  leukoplakia. Dental:  Mod swelling and abscess along buccal gumline adjacent to teeth 11-13. Trismus: mild. Drooling: No.         Airway stridor: No.  Facial skin: left warmth, tenderness, swelling and erythematous. Respiratory:  Clear to auscultation and breath sounds equal.    CV: Regular rate and rhythm, normal heart sounds, without pathological murmurs, ectopy, gallops, or rubs. Integument:  No rashes, erythema or lesions present, unless noted elsewhere. .  Lymphatics: No lymphangitis or adenopathy noted. Neurological:  Oriented. Motor functions intact. Lab / Imaging Results   (All laboratory and radiology results have been personally reviewed by myself)  Labs:  No results found for this visit on 03/27/22. Imaging: All Radiology results interpreted by Radiologist unless otherwise noted. No orders to display     ED Course / Medical Decision Making   Medications - No data to display    Procedure(s):    none. Plan of Care/Counseling:  Plan to send patient directly to [facility] ER for further eval and treatment    Assessment      1. Facial abscess      Plan   Discharge to [facility] ER  Patient condition is stable    New Medications     New Prescriptions    No medications on file     Electronically signed by SHA Diaz   DD: 3/27/22  **This report was transcribed using voice recognition software. Every effort was made to ensure accuracy; however, inadvertent computerized transcription errors may be present.   Sarah OF ED PROVIDER NOTE       Shara Eppersonma  03/27/22 0318

## 2022-03-28 LAB
ALBUMIN SERPL-MCNC: 4.5 G/DL (ref 3.5–5.2)
ALP BLD-CCNC: 54 U/L (ref 40–129)
ALT SERPL-CCNC: 36 U/L (ref 0–40)
ANION GAP SERPL CALCULATED.3IONS-SCNC: 14 MMOL/L (ref 7–16)
AST SERPL-CCNC: 17 U/L (ref 0–39)
BASOPHILS ABSOLUTE: 0.01 E9/L (ref 0–0.2)
BASOPHILS RELATIVE PERCENT: 0.1 % (ref 0–2)
BILIRUB SERPL-MCNC: 0.5 MG/DL (ref 0–1.2)
BUN BLDV-MCNC: 11 MG/DL (ref 6–20)
CALCIUM SERPL-MCNC: 9.4 MG/DL (ref 8.6–10.2)
CHLORIDE BLD-SCNC: 101 MMOL/L (ref 98–107)
CO2: 24 MMOL/L (ref 22–29)
CREAT SERPL-MCNC: 0.7 MG/DL (ref 0.7–1.2)
EOSINOPHILS ABSOLUTE: 0 E9/L (ref 0.05–0.5)
EOSINOPHILS RELATIVE PERCENT: 0 % (ref 0–6)
FOLATE: 17 NG/ML (ref 4.8–24.2)
GFR AFRICAN AMERICAN: >60
GFR NON-AFRICAN AMERICAN: >60 ML/MIN/1.73
GLUCOSE BLD-MCNC: 202 MG/DL (ref 74–99)
HBA1C MFR BLD: 6 % (ref 4–5.6)
HCT VFR BLD CALC: 40.6 % (ref 37–54)
HEMOGLOBIN: 14 G/DL (ref 12.5–16.5)
IMMATURE GRANULOCYTES #: 0.11 E9/L
IMMATURE GRANULOCYTES %: 0.9 % (ref 0–5)
IRON SATURATION: 15 % (ref 20–55)
IRON: 46 MCG/DL (ref 59–158)
LYMPHOCYTES ABSOLUTE: 1.1 E9/L (ref 1.5–4)
LYMPHOCYTES RELATIVE PERCENT: 8.6 % (ref 20–42)
MAGNESIUM: 2.2 MG/DL (ref 1.6–2.6)
MCH RBC QN AUTO: 31.2 PG (ref 26–35)
MCHC RBC AUTO-ENTMCNC: 34.5 % (ref 32–34.5)
MCV RBC AUTO: 90.4 FL (ref 80–99.9)
METER GLUCOSE: 170 MG/DL (ref 74–99)
METER GLUCOSE: 175 MG/DL (ref 74–99)
METER GLUCOSE: 265 MG/DL (ref 74–99)
MONOCYTES ABSOLUTE: 0.5 E9/L (ref 0.1–0.95)
MONOCYTES RELATIVE PERCENT: 3.9 % (ref 2–12)
NEUTROPHILS ABSOLUTE: 11.14 E9/L (ref 1.8–7.3)
NEUTROPHILS RELATIVE PERCENT: 86.5 % (ref 43–80)
PDW BLD-RTO: 13.2 FL (ref 11.5–15)
PLATELET # BLD: 275 E9/L (ref 130–450)
PMV BLD AUTO: 10.1 FL (ref 7–12)
POTASSIUM SERPL-SCNC: 3.9 MMOL/L (ref 3.5–5)
RBC # BLD: 4.49 E12/L (ref 3.8–5.8)
SODIUM BLD-SCNC: 139 MMOL/L (ref 132–146)
TOTAL IRON BINDING CAPACITY: 312 MCG/DL (ref 250–450)
TOTAL PROTEIN: 7.6 G/DL (ref 6.4–8.3)
TSH SERPL DL<=0.05 MIU/L-ACNC: 0.41 UIU/ML (ref 0.27–4.2)
VITAMIN B-12: 859 PG/ML (ref 211–946)
VITAMIN D 25-HYDROXY: 19 NG/ML (ref 30–100)
WBC # BLD: 12.9 E9/L (ref 4.5–11.5)

## 2022-03-28 PROCEDURE — 84443 ASSAY THYROID STIM HORMONE: CPT

## 2022-03-28 PROCEDURE — 2580000003 HC RX 258: Performed by: INTERNAL MEDICINE

## 2022-03-28 PROCEDURE — 83036 HEMOGLOBIN GLYCOSYLATED A1C: CPT

## 2022-03-28 PROCEDURE — 36415 COLL VENOUS BLD VENIPUNCTURE: CPT

## 2022-03-28 PROCEDURE — 6360000002 HC RX W HCPCS: Performed by: INTERNAL MEDICINE

## 2022-03-28 PROCEDURE — 1200000000 HC SEMI PRIVATE

## 2022-03-28 PROCEDURE — 82962 GLUCOSE BLOOD TEST: CPT

## 2022-03-28 PROCEDURE — 83540 ASSAY OF IRON: CPT

## 2022-03-28 PROCEDURE — 80053 COMPREHEN METABOLIC PANEL: CPT

## 2022-03-28 PROCEDURE — 85025 COMPLETE CBC W/AUTO DIFF WBC: CPT

## 2022-03-28 PROCEDURE — 83550 IRON BINDING TEST: CPT

## 2022-03-28 PROCEDURE — 83735 ASSAY OF MAGNESIUM: CPT

## 2022-03-28 PROCEDURE — 6370000000 HC RX 637 (ALT 250 FOR IP): Performed by: INTERNAL MEDICINE

## 2022-03-28 PROCEDURE — 82607 VITAMIN B-12: CPT

## 2022-03-28 PROCEDURE — 82306 VITAMIN D 25 HYDROXY: CPT

## 2022-03-28 PROCEDURE — 82746 ASSAY OF FOLIC ACID SERUM: CPT

## 2022-03-28 RX ORDER — CARVEDILOL 6.25 MG/1
12.5 TABLET ORAL 2 TIMES DAILY WITH MEALS
Status: DISCONTINUED | OUTPATIENT
Start: 2022-03-28 | End: 2022-03-29 | Stop reason: HOSPADM

## 2022-03-28 RX ORDER — DEXTROSE MONOHYDRATE 25 G/50ML
12.5 INJECTION, SOLUTION INTRAVENOUS PRN
Status: DISCONTINUED | OUTPATIENT
Start: 2022-03-28 | End: 2022-03-29 | Stop reason: HOSPADM

## 2022-03-28 RX ORDER — DEXTROSE MONOHYDRATE 50 MG/ML
100 INJECTION, SOLUTION INTRAVENOUS PRN
Status: DISCONTINUED | OUTPATIENT
Start: 2022-03-28 | End: 2022-03-29 | Stop reason: HOSPADM

## 2022-03-28 RX ORDER — NICOTINE POLACRILEX 4 MG
15 LOZENGE BUCCAL PRN
Status: DISCONTINUED | OUTPATIENT
Start: 2022-03-28 | End: 2022-03-29 | Stop reason: HOSPADM

## 2022-03-28 RX ADMIN — SODIUM CHLORIDE 3000 MG: 900 INJECTION INTRAVENOUS at 10:16

## 2022-03-28 RX ADMIN — Medication 10 ML: at 09:55

## 2022-03-28 RX ADMIN — INSULIN LISPRO 1 UNITS: 100 INJECTION, SOLUTION INTRAVENOUS; SUBCUTANEOUS at 23:02

## 2022-03-28 RX ADMIN — DEXAMETHASONE SODIUM PHOSPHATE 4 MG: 4 INJECTION, SOLUTION INTRAMUSCULAR; INTRAVENOUS at 06:17

## 2022-03-28 RX ADMIN — ENOXAPARIN SODIUM 30 MG: 100 INJECTION SUBCUTANEOUS at 20:32

## 2022-03-28 RX ADMIN — DEXAMETHASONE SODIUM PHOSPHATE 4 MG: 4 INJECTION, SOLUTION INTRAMUSCULAR; INTRAVENOUS at 15:32

## 2022-03-28 RX ADMIN — DEXAMETHASONE SODIUM PHOSPHATE 4 MG: 4 INJECTION, SOLUTION INTRAMUSCULAR; INTRAVENOUS at 20:33

## 2022-03-28 RX ADMIN — ACETAMINOPHEN 1000 MG: 500 TABLET ORAL at 16:34

## 2022-03-28 RX ADMIN — CARVEDILOL 12.5 MG: 6.25 TABLET, FILM COATED ORAL at 08:40

## 2022-03-28 RX ADMIN — ASPIRIN 81 MG: 81 TABLET, COATED ORAL at 08:40

## 2022-03-28 RX ADMIN — INSULIN LISPRO 1 UNITS: 100 INJECTION, SOLUTION INTRAVENOUS; SUBCUTANEOUS at 16:35

## 2022-03-28 RX ADMIN — DEXAMETHASONE SODIUM PHOSPHATE 4 MG: 4 INJECTION, SOLUTION INTRAMUSCULAR; INTRAVENOUS at 08:41

## 2022-03-28 RX ADMIN — ENOXAPARIN SODIUM 30 MG: 100 INJECTION SUBCUTANEOUS at 06:16

## 2022-03-28 RX ADMIN — ACETAMINOPHEN 1000 MG: 500 TABLET ORAL at 08:41

## 2022-03-28 RX ADMIN — VANCOMYCIN HYDROCHLORIDE 2000 MG: 10 INJECTION, POWDER, LYOPHILIZED, FOR SOLUTION INTRAVENOUS at 03:30

## 2022-03-28 RX ADMIN — SODIUM CHLORIDE 3000 MG: 900 INJECTION INTRAVENOUS at 16:39

## 2022-03-28 RX ADMIN — FAMOTIDINE 20 MG: 20 TABLET ORAL at 08:41

## 2022-03-28 RX ADMIN — INSULIN LISPRO 3 UNITS: 100 INJECTION, SOLUTION INTRAVENOUS; SUBCUTANEOUS at 11:51

## 2022-03-28 RX ADMIN — SODIUM CHLORIDE 3000 MG: 900 INJECTION INTRAVENOUS at 06:17

## 2022-03-28 RX ADMIN — Medication 10 ML: at 20:36

## 2022-03-28 RX ADMIN — Medication 1 CAPSULE: at 20:32

## 2022-03-28 RX ADMIN — ACETAMINOPHEN 1000 MG: 500 TABLET ORAL at 23:02

## 2022-03-28 RX ADMIN — VANCOMYCIN HYDROCHLORIDE 2000 MG: 10 INJECTION, POWDER, LYOPHILIZED, FOR SOLUTION INTRAVENOUS at 11:50

## 2022-03-28 RX ADMIN — Medication 1 CAPSULE: at 08:40

## 2022-03-28 RX ADMIN — FAMOTIDINE 20 MG: 20 TABLET ORAL at 23:02

## 2022-03-28 RX ADMIN — CARVEDILOL 12.5 MG: 6.25 TABLET, FILM COATED ORAL at 16:35

## 2022-03-28 RX ADMIN — SODIUM CHLORIDE 3000 MG: 900 INJECTION INTRAVENOUS at 23:39

## 2022-03-28 ASSESSMENT — PAIN SCALES - GENERAL
PAINLEVEL_OUTOF10: 6
PAINLEVEL_OUTOF10: 5

## 2022-03-28 NOTE — CONSULTS
Department of Internal Medicine  Infectious Diseases   Consult Note      Reason for Consult:  Facial cellulitis     Requesting Physician:  Dr Keyur Campos:                This is a 30 yrs old male with hx of HTN, A fib, new dx of  DM presented to the ER with facial swelling, pain, left eyelid swelling for 3-4 days . He stated that he had bad teeth and was supposed to see dentist next month .  He denied fever, chills, dysphagia, or shortness of breath   WBC was 12.9 K , Facial CT scan - soft tissue swelling  over the maxilla,   extending across the midline but more to the left and extending up to the level of orbit He was started on vancomycin and unasyn       Past Medical History:    Past Medical History:   Diagnosis Date    Atrial fibrillation ([facility] [address]     Hypertension        Past Surgical History:      Knee surgeries     Current Medications:      Current Facility-Administered Medications   Medication Dose Route Frequency Provider Last Rate Last Admin    carvedilol (COREG) tablet 12.5 mg  12.5 mg Oral BID WC Cesar Thompson MD   12.5 mg at 03/28/22 0840    glucose (GLUTOSE) 40 % oral gel 15 g  15 g Oral PRN Cesar Thompson MD        dextrose 50 % IV solution  12.5 g IntraVENous PRN Cesar Thompson MD        glucagon (rDNA) injection 1 mg  1 mg IntraMUSCular PRN Cesar Thompson MD        dextrose 5 % solution  100 mL/hr IntraVENous PRN Cesar Thompson MD        insulin lispro (HUMALOG) injection vial 0-6 Units  0-6 Units SubCUTAneous TID WC Cesar Thompson MD   3 Units at 03/28/22 1151    insulin lispro (HUMALOG) injection vial 0-3 Units  0-3 Units SubCUTAneous Nightly Cesar Thompson MD        ampicillin-sulbactam (UNASYN) 3000 mg ivpb minibag  3,000 mg IntraVENous Q6H Cesar Thompson MD   Stopped at 03/28/22 1106    lactobacillus (CULTURELLE) capsule 1 capsule  1 capsule Oral BID Cesar Thompson MD   1 capsule at 03/28/22 0840    acetaminophen (TYLENOL) tablet 1,000 mg  1,000 mg Oral 4x Daily Keith Holguin MD   1,000 mg at 03/28/22 0841    ondansetron (ZOFRAN) injection 4 mg  4 mg IntraVENous Q6H PRN Keith Holguin MD        ondansetron (ZOFRAN-ODT) disintegrating tablet 4 mg  4 mg Oral Q8H PRN Keith Holguin MD        sodium chloride flush 0.9 % injection 5-40 mL  5-40 mL IntraVENous 2 times per day Keith Holguin MD   10 mL at 03/28/22 0955    sodium chloride flush 0.9 % injection 5-40 mL  5-40 mL IntraVENous PRN Keith Holguin MD        0.9 % sodium chloride infusion  25 mL IntraVENous PRN Keith Holguin MD        enoxaparin (LOVENOX) injection 30 mg  30 mg SubCUTAneous BID Keith Holguin MD   30 mg at 03/28/22 9771    sennosides (SENOKOT) 8.8 MG/5ML syrup 5 mL  5 mL Oral BID PRN Keith Holguin MD        cloNIDine (CATAPRES) tablet 0.1 mg  0.1 mg Oral Q4H PRN Keith Holguin MD        aspirin EC tablet 81 mg  81 mg Oral Daily Keith Holguin MD   81 mg at 03/28/22 0840    dexamethasone (DECADRON) injection 4 mg  4 mg IntraVENous Q6H Keith Holguin MD   4 mg at 03/28/22 0841    famotidine (PEPCID) tablet 20 mg  20 mg Oral BID Keith Holguin MD   20 mg at 03/28/22 0841    vancomycin (VANCOCIN) 2,000 mg in dextrose 5 % 500 mL IVPB  2,000 mg IntraVENous Q12H Keith Holguin MD   Stopped at 03/28/22 1154       Allergies:  Bee venom and Dilantin [phenytoin]    Social History:      Social History     Socioeconomic History    Marital status: Single     Spouse name: Not on file    Number of children: Not on file    Years of education: Not on file    Highest education level: Not on file   Occupational History    Not on file   Tobacco Use    Smoking status: Never Smoker    Smokeless tobacco: Current User     Types: Chew   Vaping Use    Vaping Use: Never used   Substance and Sexual Activity    Alcohol use: Not Currently    Drug use: Not Currently     Types: Opiates      Comment: Pt States \"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\"     Sexual activity: Yes   Other Topics Concern    Not on file   Social History Narrative    Not on file     Social Determinants of Health     Financial Resource Strain:     Difficulty of Paying Living Expenses: Not on file   Food Insecurity:     Worried About Running Out of Food in the Last Year: Not on file    John of Food in the Last Year: Not on file   Transportation Needs:     Lack of Transportation (Medical): Not on file    Lack of Transportation (Non-Medical): Not on file   Physical Activity:     Days of Exercise per Week: Not on file    Minutes of Exercise per Session: Not on file   Stress:     Feeling of Stress : Not on file   Social Connections:     Frequency of Communication with Friends and Family: Not on file    Frequency of Social Gatherings with Friends and Family: Not on file    Attends Evangelical Services: Not on file    Active Member of [address] [place] [address] or Organizations: Not on file    Attends Club or Organization Meetings: Not on file    Marital Status: Not on file   Intimate Partner Violence:     Fear of Current or Ex-Partner: Not on file    Emotionally Abused: Not on file    Physically Abused: Not on file    Sexually Abused: Not on file   Housing Stability:     Unable to Pay for Housing in the Last Year: Not on file    Number of [place] in the Last Year: Not on file    Unstable Housing in the Last Year: Not on file       Family History:     No hx of DM, HTN     REVIEW OF SYSTEMS:    CONSTITUTIONAL:  Denies fever, chill or rigors  HEENT: tooth ache, facial swelling, left eye swelling   RESPIRATORY: denies cough, shortness of breath, sputum expectoration, chest pain. CARDIOVASCULAR:  Denies palpitation  GASTROINTESTINAL:  Denies abdomen pain, diarrhea or constipation.   GENITOURINARY:  Denies burning urination or frequency of urination  INTEGUMENT: denies wound , rash  HEMATOLOGIC/LYMPHATIC:  Denies lymph node swelling, gum bleeding or easy bruising. MUSCULOSKELETAL:  Denies leg pain , joint pain , joint swelling  NEUROLOGICAL:  Denies light headed, dizziness, loss of consciousness, weakness of lower extremities, bowel or bladder incontinence. PHYSICAL EXAM:      Vitals:     Vitals:    03/28/22 0815   BP: (!) 165/94   Pulse: 89   Resp: 16   Temp: 98 °F (36.7 °C)   SpO2: 97%       General Appearance:    Awake, alert , no acute distress. Head:    Normocephalic, atraumatic   Eyes:    Left eyelid swelling improving, vision intact    Ears:    No obvious deformity or drainage.    Nose:   No nasal drainage   Throat:   Mucosa moist, no oral thrush  Dental caries and erosion    Neck:   Supple, no lymphadenopathy   Back:     no CVA tenderness   Lungs:     Clear to auscultation bilaterally, no wheeze    Heart:    Regular rate and rhythm, no murmur   Abdomen:     Soft, non-tender, bowel sounds present    Extremities:   No edema, no cyanosis ,no open wound   Pulses:   Dorsalis pedis palpable    Skin:   no rashes or lesions       CBC with Differential:      Lab Results   Component Value Date    WBC 12.9 03/28/2022    RBC 4.49 03/28/2022    HGB 14.0 03/28/2022    HCT 40.6 03/28/2022     03/28/2022    MCV 90.4 03/28/2022    MCH 31.2 03/28/2022    MCHC 34.5 03/28/2022    RDW 13.2 03/28/2022    LYMPHOPCT 8.6 03/28/2022    MONOPCT 3.9 03/28/2022    BASOPCT 0.1 03/28/2022    MONOSABS 0.50 03/28/2022    LYMPHSABS 1.10 03/28/2022    EOSABS 0.00 03/28/2022    BASOSABS 0.01 03/28/2022       CMP     Lab Results   Component Value Date     03/28/2022    K 3.9 03/28/2022    K 3.8 03/27/2022     03/28/2022    CO2 24 03/28/2022    BUN 11 03/28/2022    CREATININE 0.7 03/28/2022    GFRAA >60 03/28/2022    LABGLOM >60 03/28/2022    GLUCOSE 202 03/28/2022    PROT 7.6 03/28/2022    LABALBU 4.5 03/28/2022    CALCIUM 9.4 03/28/2022    BILITOT 0.5 03/28/2022    ALKPHOS 54 03/28/2022    AST 17 03/28/2022    ALT 36 03/28/2022         Hepatic Function Panel: Lab Results   Component Value Date    ALKPHOS 54 03/28/2022    ALT 36 03/28/2022    AST 17 03/28/2022    PROT 7.6 03/28/2022    BILITOT 0.5 03/28/2022    LABALBU 4.5 03/28/2022       PT/INR:  No results found for: PROTIME, INR    TSH:    Lab Results   Component Value Date    TSH 0.410 03/28/2022       U/A:  No results found for: NITRITE, COLORU, PHUR, LABCAST, WBCUA, RBCUA, MUCUS, TRICHOMONAS, YEAST, BACTERIA, CLARITYU, SPECGRAV, LEUKOCYTESUR, UROBILINOGEN, BILIRUBINUR, BLOODU, GLUCOSEU, AMORPHOUS    ABG:  No results found for: BLW5GOR, BEART, H0DATRKT, PHART, THGBART, YBU5SFB, PO2ART, QWP8XEA    MICROBIOLOGY:    Blood culture - negative       Radiology :    CT face -           1. Findings consistent with extensive facial cellulitis over the maxilla,   extending across the midline but more to the left and extending up to the   level of the orbits with left orbital preseptal cellulitis. 2. No evidence of an abscess. 3. No evidence of intra orbital abscess or postseptal cellulitis. 4. Poor visualization of the supraglottic structures including the   epiglottis, valleculae, piriform sinuses and aryepiglottic folds.  The   significance of this is uncertain and may be artifactual.  The possibility of   inflammatory process extending to the supraglottic region cannot be excluded.              IMPRESSION:     1. Facial cellulitis / preseptal cellulitis - improving   2. Leukocytosis       RECOMMENDATIONS:      1. Unasyn 3 grams IV q 6 hrs   2.  Stop vancomycin ( since cellulitis is of dental origin and MRSA is not usual causative agent )       Thank you Dr Cayla Hannah for the consult

## 2022-03-28 NOTE — PROGRESS NOTES
Pharmacy Consultation Note  (Antibiotic Dosing and Monitoring)    Initial consult date: 3/27/22  Consulting physician/provider:Charlie  Drug: Vancomycin  Indication: Head and neck infection    Age/  Gender Height Weight IBW  Allergy Information   37 y.o./male 5' 11\" (180.3 cm) (!) 307 lb (139.3 kg)     Ideal body weight: 75.3 kg (166 lb 0.1 oz)  Adjusted ideal body weight: 100.9 kg (222 lb 6.5 oz)   Bee venom and Dilantin [phenytoin]      Renal Function:  Recent Labs     03/27/22  1022   BUN 10   CREATININE 0.7     No intake or output data in the 24 hours ending 03/28/22 0750    Vancomycin Monitoring:  Trough:  No results for input(s): VANCOTROUGH in the last 72 hours. Random:  No results for input(s): VANCORANDOM in the last 72 hours. Vancomycin Administration Times:  Recent vancomycin administrations      No vancomycin IV orders with administrations found. Orders not given:          vancomycin (VANCOCIN) 2,750 mg in dextrose 5 % 600 mL IVPB    vancomycin (VANCOCIN) 2,000 mg in dextrose 5 % 500 mL IVPB                Assessment:  · Patient is a 30 y.o. male who has been initiated on vancomycin  Estimated Creatinine Clearance: 210 mL/min (based on SCr of 0.7 mg/dL).   · To dose vancomycin, pharmacy will be utilizing [employer] calculation software for goal AUC/NANY 400-600 mg/L-hr    Plan:  · Will continue vancomycin 2g IV every 12 hours  · Will check vancomycin levels when appropriate  · Will continue to monitor renal function   · Clinical pharmacy to follow      FLORES Fontana [facility] 3/28/2022 7:50 AM

## 2022-03-28 NOTE — PROGRESS NOTES
Hospitalist Progress Note            Patient: Chivo Argueta Age: 30 y.o.   DOA: 3/27/2022 Admit Dx / CC: Preseptal cellulitis [M78.120]  Facial cellulitis [V15.093]  Class 3 severe obesity due to excess calories with serious comorbidity and body mass index (BMI) of 40.0 to 44.9 in adult ([facility] [address] [E66.01, Z68.41]   LOS:  LOS: 1 day      Assessment/ Plan:     Severe facial cellulitis (much improved) 2/2 very poor dentition   Pt has appt with dentistry 4/4/22  MRSA scrn pend  C/w IV Unasyn, vancomycin   IV decadron  Encouraged to sleep on his back  Pepcid  Diet; soft and small bites  ID consulted and awaiting input     Hx/o HTN  Pt has not been on BP meds for a long time. Has been on Lisinopril in the past  Started coreg and titrate as needed  Prn clonidine     Hx/o afib  Not on meds  Started ASA    New DM2  A1c 6  Start ISS  Recommend to start metformin on discharge  Diabetes ED  Needs OP f/u     Suspect MAYE  Ordered OP PSG  Explained consequences of untreated MAYE incld poor control of BG and BP     Morbid Obesity, class III, BMI 42     Chews tobacco since age 17 y/o  Counseled on cessation     Remote hx/o narcotic and alcohol abuse  Sober for >3 yrs     DVT px: Lovenox  Code status: Full code      Plan discharge home pend coarse ?tomorrow if ok with ID    Plan of care discussed with: patient, CM and bedside RN     CT:  Impression   1. Findings consistent with extensive facial cellulitis over the maxilla,   extending across the midline but more to the left and extending up to the   level of the orbits with left orbital preseptal cellulitis. 2. No evidence of an abscess. 3. No evidence of intra orbital abscess or postseptal cellulitis.    4. Poor visualization of the supraglottic structures including the   epiglottis, valleculae, piriform sinuses and aryepiglottic folds.  The   significance of this is uncertain and may be artifactual.  The possibility of   inflammatory process extending to the supraglottic region cannot be excluded.        Patient Active Problem List   Diagnosis    Facial swelling    Poor dentition    Facial cellulitis    HTN (hypertension)    A-fib (HCC)    MAYE (obstructive sleep apnea)    Class 3 severe obesity due to excess calories in adult [facility])    Remote history of Narcotic abuse ([facility])    Chews tobacco regularly    Remote history of Alcohol abuse        Medications:  Reviewed    Infusion Medications    dextrose      sodium chloride       Scheduled Medications    carvedilol  12.5 mg Oral BID WC    insulin lispro  0-6 Units SubCUTAneous TID WC    insulin lispro  0-3 Units SubCUTAneous Nightly    ampicillin-sulbactam  3,000 mg IntraVENous Q6H    lactobacillus  1 capsule Oral BID    acetaminophen  1,000 mg Oral 4x Daily    sodium chloride flush  5-40 mL IntraVENous 2 times per day    enoxaparin  30 mg SubCUTAneous BID    aspirin  81 mg Oral Daily    dexamethasone  4 mg IntraVENous Q6H    famotidine  20 mg Oral BID    vancomycin  2,000 mg IntraVENous Q12H     PRN Meds: glucose, dextrose, glucagon (rDNA), dextrose, ondansetron, ondansetron, sodium chloride flush, sodium chloride, sennosides, cloNIDine    I/O  No intake or output data in the 24 hours ending 03/28/22 1135    Labs:   Recent Labs     03/27/22  1022 03/28/22  0610   WBC 12.5* 12.9*   HGB 14.5 14.0   HCT 42.5 40.6    275       Recent Labs     03/27/22  1022 03/28/22  0610    139   K 3.8 3.9    101   CO2 23 24   BUN 10 11   CREATININE 0.7 0.7   CALCIUM 9.3 9.4       Recent Labs     03/28/22  0610   PROT 7.6   ALKPHOS 54   ALT 36   AST 17   BILITOT 0.5       No results for input(s): INR in the last 72 hours. No results for input(s): Aleksandar Figueroaer in the last 72 hours. Chronic labs:  Lab Results   Component Value Date    TSH 0.410 03/28/2022    LABA1C 6.0 (H) 03/28/2022       Radiology:  Imaging studies reviewed today. Subjective:     Paxton Cecilia feeling much better.  Can open his L eye better nw    Objective:     Physical Exam:   BP (!) 165/94   Pulse 89   Temp 98 °F (36.7 °C) (Oral)   Resp 16   Ht 5' 11\" (1.803 m)   Wt (!) 307 lb (139.3 kg)   SpO2 97%   BMI 42.82 kg/m²     General appearance:in no distress. Much decreased swelling around L eye and mandible  Lungs: Clear to auscultation bilaterally, no wheezing or crackles   Heart: Regular rate and rhythm, S1, S2 normal   Abdomen: Soft, non-tender and not-distended.  Bowel sounds normal.   Extremities: no edema   Neurologic: Grossly normal and non focal, CN II-XII intact       Bud Rich MD   Hospitalist Service   03/28/22 11:35 AM

## 2022-03-28 NOTE — CARE COORDINATION
Patient with PMH HTN and afib not on meds is admitted with Severe facial cellulitis 2/2 very poor dentition. ID is following and patient is currently on Unasyn 3 grams IV q 6 hrs, says cellulitis is improving. Will need plan from ID for antibiotics at discharge. Home care referral not made due to anticipation of po antibiotics at discharge due to cellulitis improving. Cm/Sw will follow for any discharge needs.   Lori Lawrence RN CM  [phone]

## 2022-03-28 NOTE — PROGRESS NOTES
Reason for consult: new DM2    A1C: 6.0%     [] Not available                     Patient states the following concerns/barriers to diabetes self-management:     [x] None       [] Medication cost   [] Food cost/availability        [] Reading  [] Hearing   [] Vision                [] Work    [] Transportation  [] No insurance  [] Physical limitations    [] Other:        Patient states the following about their diabetes/health:   [x]  Does not drink any sugary beverages, will drink over a gallon of water per day   [x]  Lifts weights most days of the week, does not engage in many cardio workouts  [x]  Takes pre-workout supplement  [x]  Eats a protein and either rice and potatoes for most meals        Diabetes survival packet provided to:   [x] Patient     [] Other:    Information reviewed:   Definition of diabetes   Target glucose ranges/A1C   Self-monitoring of blood glucose   Prevention/symptoms/treatment of hypo-/hyperglycemia   Medication adherence   The plate method/meal planning guidelines   The benefits of exercise and recommendations   Reducing the risk of chronic complications      Diabetes medications reviewed (use, purpose, action):             Post-education Assessment  [x]  Attentive to teaching  [x]  Answered questions appropriately when asked   [x]  Seems able to apply concepts to daily lifestyle  [x]  Seems motivated to do well  [x]  Verbalized an understanding of the plate method for portion control   [x]  Verbalized an understanding of prescribed antidiabetes medications   [x]  Verbalized an understanding of target glucose ranges/A1C level  [x]  Expresses an intent to comply with treatment plan   []  Showed very little interest in complying with treatment plan   []  Seems to have trouble applying concepts to daily lifestyle       COMMENTS: Patient has no history of elevated blood glucose. Reviewed all possible causes of elevated blood glucose.  Target ranges for BG and A1c reviewed, as well as

## 2022-03-29 VITALS
DIASTOLIC BLOOD PRESSURE: 99 MMHG | HEIGHT: 71 IN | BODY MASS INDEX: 42.98 KG/M2 | TEMPERATURE: 97.5 F | HEART RATE: 77 BPM | RESPIRATION RATE: 18 BRPM | OXYGEN SATURATION: 96 % | SYSTOLIC BLOOD PRESSURE: 155 MMHG | WEIGHT: 307 LBS

## 2022-03-29 LAB
ANION GAP SERPL CALCULATED.3IONS-SCNC: 10 MMOL/L (ref 7–16)
BASOPHILS ABSOLUTE: 0.01 E9/L (ref 0–0.2)
BASOPHILS RELATIVE PERCENT: 0.1 % (ref 0–2)
BUN BLDV-MCNC: 16 MG/DL (ref 6–20)
CALCIUM SERPL-MCNC: 9.2 MG/DL (ref 8.6–10.2)
CHLORIDE BLD-SCNC: 101 MMOL/L (ref 98–107)
CO2: 27 MMOL/L (ref 22–29)
CREAT SERPL-MCNC: 0.7 MG/DL (ref 0.7–1.2)
EOSINOPHILS ABSOLUTE: 0 E9/L (ref 0.05–0.5)
EOSINOPHILS RELATIVE PERCENT: 0 % (ref 0–6)
GFR AFRICAN AMERICAN: >60
GFR NON-AFRICAN AMERICAN: >60 ML/MIN/1.73
GLUCOSE BLD-MCNC: 148 MG/DL (ref 74–99)
HCT VFR BLD CALC: 40 % (ref 37–54)
HEMOGLOBIN: 13.6 G/DL (ref 12.5–16.5)
IMMATURE GRANULOCYTES #: 0.1 E9/L
IMMATURE GRANULOCYTES %: 0.7 % (ref 0–5)
LYMPHOCYTES ABSOLUTE: 1.06 E9/L (ref 1.5–4)
LYMPHOCYTES RELATIVE PERCENT: 7.4 % (ref 20–42)
MCH RBC QN AUTO: 31.1 PG (ref 26–35)
MCHC RBC AUTO-ENTMCNC: 34 % (ref 32–34.5)
MCV RBC AUTO: 91.5 FL (ref 80–99.9)
METER GLUCOSE: 163 MG/DL (ref 74–99)
METER GLUCOSE: 173 MG/DL (ref 74–99)
MONOCYTES ABSOLUTE: 0.67 E9/L (ref 0.1–0.95)
MONOCYTES RELATIVE PERCENT: 4.7 % (ref 2–12)
MRSA CULTURE ONLY: NORMAL
NEUTROPHILS ABSOLUTE: 12.45 E9/L (ref 1.8–7.3)
NEUTROPHILS RELATIVE PERCENT: 87.1 % (ref 43–80)
PDW BLD-RTO: 13.3 FL (ref 11.5–15)
PLATELET # BLD: 277 E9/L (ref 130–450)
PMV BLD AUTO: 10 FL (ref 7–12)
POTASSIUM SERPL-SCNC: 4 MMOL/L (ref 3.5–5)
RBC # BLD: 4.37 E12/L (ref 3.8–5.8)
SODIUM BLD-SCNC: 138 MMOL/L (ref 132–146)
VANCOMYCIN RANDOM: <4 MCG/ML (ref 5–40)
WBC # BLD: 14.3 E9/L (ref 4.5–11.5)

## 2022-03-29 PROCEDURE — 80048 BASIC METABOLIC PNL TOTAL CA: CPT

## 2022-03-29 PROCEDURE — 85025 COMPLETE CBC W/AUTO DIFF WBC: CPT

## 2022-03-29 PROCEDURE — 80202 ASSAY OF VANCOMYCIN: CPT

## 2022-03-29 PROCEDURE — 6360000002 HC RX W HCPCS: Performed by: INTERNAL MEDICINE

## 2022-03-29 PROCEDURE — 2580000003 HC RX 258: Performed by: INTERNAL MEDICINE

## 2022-03-29 PROCEDURE — 36415 COLL VENOUS BLD VENIPUNCTURE: CPT

## 2022-03-29 PROCEDURE — 6370000000 HC RX 637 (ALT 250 FOR IP): Performed by: INTERNAL MEDICINE

## 2022-03-29 PROCEDURE — 82962 GLUCOSE BLOOD TEST: CPT

## 2022-03-29 RX ORDER — AMOXICILLIN AND CLAVULANATE POTASSIUM 562.5; 437.5; 62.5 MG/1; MG/1; MG/1
2 TABLET, FILM COATED, EXTENDED RELEASE ORAL EVERY 12 HOURS SCHEDULED
Qty: 40 TABLET | Refills: 1 | Status: SHIPPED | OUTPATIENT
Start: 2022-03-29 | End: 2022-04-08

## 2022-03-29 RX ORDER — CARVEDILOL 12.5 MG/1
12.5 TABLET ORAL 2 TIMES DAILY WITH MEALS
Qty: 60 TABLET | Refills: 3 | Status: SHIPPED | OUTPATIENT
Start: 2022-03-29

## 2022-03-29 RX ORDER — AMOXICILLIN AND CLAVULANATE POTASSIUM 562.5; 437.5; 62.5 MG/1; MG/1; MG/1
2 TABLET, FILM COATED, EXTENDED RELEASE ORAL EVERY 12 HOURS SCHEDULED
Status: DISCONTINUED | OUTPATIENT
Start: 2022-03-29 | End: 2022-03-29 | Stop reason: HOSPADM

## 2022-03-29 RX ORDER — ASPIRIN 81 MG/1
81 TABLET ORAL DAILY
Qty: 30 TABLET | Refills: 3 | Status: SHIPPED | OUTPATIENT
Start: 2022-03-30

## 2022-03-29 RX ADMIN — DEXAMETHASONE SODIUM PHOSPHATE 4 MG: 4 INJECTION, SOLUTION INTRAMUSCULAR; INTRAVENOUS at 14:41

## 2022-03-29 RX ADMIN — DEXAMETHASONE SODIUM PHOSPHATE 4 MG: 4 INJECTION, SOLUTION INTRAMUSCULAR; INTRAVENOUS at 02:17

## 2022-03-29 RX ADMIN — INSULIN LISPRO 1 UNITS: 100 INJECTION, SOLUTION INTRAVENOUS; SUBCUTANEOUS at 09:28

## 2022-03-29 RX ADMIN — SODIUM CHLORIDE 3000 MG: 900 INJECTION INTRAVENOUS at 05:07

## 2022-03-29 RX ADMIN — ACETAMINOPHEN 1000 MG: 500 TABLET ORAL at 12:07

## 2022-03-29 RX ADMIN — Medication 10 ML: at 09:27

## 2022-03-29 RX ADMIN — SODIUM CHLORIDE 3000 MG: 900 INJECTION INTRAVENOUS at 11:08

## 2022-03-29 RX ADMIN — FAMOTIDINE 20 MG: 20 TABLET ORAL at 09:27

## 2022-03-29 RX ADMIN — ENOXAPARIN SODIUM 30 MG: 100 INJECTION SUBCUTANEOUS at 09:26

## 2022-03-29 RX ADMIN — Medication 1 CAPSULE: at 09:26

## 2022-03-29 RX ADMIN — CARVEDILOL 12.5 MG: 6.25 TABLET, FILM COATED ORAL at 09:26

## 2022-03-29 RX ADMIN — ACETAMINOPHEN 1000 MG: 500 TABLET ORAL at 09:27

## 2022-03-29 RX ADMIN — DEXAMETHASONE SODIUM PHOSPHATE 4 MG: 4 INJECTION, SOLUTION INTRAMUSCULAR; INTRAVENOUS at 09:27

## 2022-03-29 RX ADMIN — ASPIRIN 81 MG: 81 TABLET, COATED ORAL at 09:26

## 2022-03-29 ASSESSMENT — PAIN SCALES - GENERAL
PAINLEVEL_OUTOF10: 2
PAINLEVEL_OUTOF10: 2

## 2022-03-29 NOTE — CARE COORDINATION
Per notes is admitted with Severe facial cellulitis 2/2 very poor dentition. ID is following and patient is currently on Unasyn 3 grams IV q 6. Per ID today-stop unasyn start oral augmentin for 10 days. Discharge plan home no needs and family will transport. cm/sw to follow. Electronically signed by Ignacia Alvarez RN on 3/29/2022 at 12:39 PM]      Patients augmentin script taken to outpt pharmacy- bedside rn updated.  Electronically signed by Ignacia Alvarez RN on 3/29/2022 at 2:30 PM

## 2022-03-29 NOTE — PROGRESS NOTES
CLINICAL PHARMACY NOTE: MEDS TO BEDS    Total # of Prescriptions Filled: 3   The following medications were delivered to the patient:  · Carvedilol 12.5 mg  · Adult aspirin 81 low dose  · Amoxicillin-renate 875-125 mg    Additional Documentation:  Patient picked up in pharm

## 2022-03-29 NOTE — DISCHARGE SUMMARY
Hospital Medicine Discharge Summary    Patient ID: Corie Conway      Patient's PCP: Joaquin Arboleda DO    Admit Date: 3/27/2022     Discharge Date:    3/29/2022    Admitting Physician: Mira Wolf MD     Discharge Physician: Hollie Matias MD      Condition at discharge: Stable    Disposition: Home    Discharge Diagnoses: Active Hospital Problems    Diagnosis Date Noted    Facial swelling [R22.0] 03/27/2022    Poor dentition [K08.9] 03/27/2022    Facial cellulitis [L03.211] 03/27/2022    HTN (hypertension) [I10] 03/27/2022    A-fib ([facility] [address] [I48.91] 03/27/2022    MAYE (obstructive sleep apnea) [G47.33] 03/27/2022    Class 3 severe obesity due to excess calories in [facility]) [E66.01] 03/27/2022    Remote history of Narcotic abuse ([facility] [address] [F11.10] 03/27/2022    Chews tobacco regularly [Z72.0] 03/27/2022    Remote history of Alcohol abuse [F10.10] 03/27/2022       The patient was seen and examined on day of discharge and this discharge summary is in conjunction with any daily progress note from day of discharge. Hospital Course:   Josefa Parks a 30 y. o. year old White (non-) male with PMH HTN and afib not on meds presented to ED due to facial swelling. He has poor denition and in Jan/Feb he had some teeth pulled out. On [address] he started to feel some tooth pain and called and made an appointment with his dentist of April 4th, the first available appt. His tooth pain progressed and started to have drainage from his L eye and nose this morning along with facial swelling and could not see out of L eye and so he called his mother and she took him to [facility] [address] [place] [address] and then her was referred here to [facility] for further rx. Consultation was placed to ID and patient was continued on Unasyn. Patient symptoms significantly improved and facial erythema has improved. She was transitioned to oral antibiotics.   I did have a lengthy conversation with patient regarding tablet, Refills: 3             Time Spent on discharge is more than 31 min in the examination, evaluation, counseling and review of medications and discharge plan. Signed:    Bran Frias MD   3/29/2022      Thank you Oscar Burns DO for the opportunity to be involved in this patient's care. If you have any questions or concerns please feel free to contact me. NOTE: This report was transcribed using voice recognition software. Every effort was made to ensure accuracy; however, inadvertent computerized transcription errors may be present.

## 2022-03-29 NOTE — PROGRESS NOTES
Department of Internal Medicine  Infectious Diseases  Progress Note      C/C :  Facial cellulitis     Denies fever and chills  Feels better  Facial swelling improving   Afebrile       Current Facility-Administered Medications   Medication Dose Route Frequency Provider Last Rate Last Admin    carvedilol (COREG) tablet 12.5 mg  12.5 mg Oral BID WC Licha Nguyen MD   12.5 mg at 03/29/22 0926    glucose (GLUTOSE) 40 % oral gel 15 g  15 g Oral PRN Licha Nguyen MD        dextrose 50 % IV solution  12.5 g IntraVENous PRN Licha Nguyen MD        glucagon (rDNA) injection 1 mg  1 mg IntraMUSCular PRN Licha Nguyen MD        dextrose 5 % solution  100 mL/hr IntraVENous PRN Licha Nguyen MD        insulin lispro (HUMALOG) injection vial 0-6 Units  0-6 Units SubCUTAneous TID [facility] Licha Nguyen MD   1 Units at 03/29/22 4485    insulin lispro (HUMALOG) injection vial 0-3 Units  0-3 Units SubCUTAneous Nightly Licha Nguyen MD   1 Units at 03/28/22 2302    ampicillin-sulbactam (UNASYN) 3000 mg ivpb minibag  3,000 mg IntraVENous Q6H Licha Nguyen MD   Stopped at 03/29/22 1138    lactobacillus (CULTURELLE) capsule 1 capsule  1 capsule Oral BID Licha Nguyen MD   1 capsule at 03/29/22 0926    acetaminophen (TYLENOL) tablet 1,000 mg  1,000 mg Oral 4x Daily Licha Nguyen MD   1,000 mg at 03/29/22 0927    ondansetron (ZOFRAN) injection 4 mg  4 mg IntraVENous Q6H PRN Licha Nguyen MD        ondansetron (ZOFRAN-ODT) disintegrating tablet 4 mg  4 mg Oral Q8H PRN Licha Nguyen MD        sodium chloride flush 0.9 % injection 5-40 mL  5-40 mL IntraVENous 2 times per day Licha Nguyen MD   10 mL at 03/29/22 0927    sodium chloride flush 0.9 % injection 5-40 mL  5-40 mL IntraVENous PRN Licha Nguyen MD        0.9 % sodium chloride infusion  25 mL IntraVENous PRN Licha Nguyen MD        enoxaparin (LOVENOX) injection 30 mg  30 mg SubCUTAneous BID [facility] Cristiane Daigle MD   30 mg at 03/29/22 0926    sennosides (SENOKOT) 8.8 MG/5ML syrup 5 mL  5 mL Oral BID PRN Kamran Trimble MD        cloNIDine (CATAPRES) tablet 0.1 mg  0.1 mg Oral Q4H PRN Kamran Trimble MD        aspirin EC tablet 81 mg  81 mg Oral Daily Kamran Trimble MD   81 mg at 03/29/22 0926    dexamethasone (DECADRON) injection 4 mg  4 mg IntraVENous Q6H Kamran Trimble MD   4 mg at 03/29/22 7914    famotidine (PEPCID) tablet 20 mg  20 mg Oral BID Kamran Trimble MD   20 mg at 03/29/22 7119       REVIEW OF SYSTEMS:    CONSTITUTIONAL:  Denies fever, chill or rigors  HEENT: tooth ache, facial swelling, left eye swelling   RESPIRATORY: denies cough, shortness of breath, sputum expectoration, chest pain. CARDIOVASCULAR:  Denies palpitation  GASTROINTESTINAL:  Denies abdomen pain, diarrhea or constipation. GENITOURINARY:  Denies burning urination or frequency of urination  INTEGUMENT: denies wound , rash  HEMATOLOGIC/LYMPHATIC:  Denies lymph node swelling, gum bleeding or easy bruising. MUSCULOSKELETAL:  Denies leg pain , joint pain , joint swelling  NEUROLOGICAL:  Denies light headed, dizziness, loss of consciousness, weakness of lower extremities, bowel or bladder incontinence. PHYSICAL EXAM:      Vitals:     Vitals:    03/29/22 0900   BP: (!) 155/99   Pulse: 77   Resp: 18   Temp: 97.5 °F (36.4 °C)   SpO2: 96%       General Appearance:    Awake, alert , no acute distress. Head:    Normocephalic, atraumatic   Eyes:    Left eyelid swelling improved , vision intact    Ears:    No obvious deformity or drainage.    Nose:   No nasal drainage   Throat:   Mucosa moist, no oral thrush  Dental caries and erosion    Neck:   Supple, no lymphadenopathy   Lungs:     Clear to auscultation bilaterally, no wheeze    Heart:    Regular rate and rhythm, no murmur   Abdomen:     Soft, non-tender, bowel sounds present    Extremities:   No edema, no cyanosis ,no open wound   Pulses:   Dorsalis pedis palpable    Skin:   no rashes or lesions       CBC with Differential:      Lab Results   Component Value Date    WBC 14.3 03/29/2022    RBC 4.37 03/29/2022    HGB 13.6 03/29/2022    HCT 40.0 03/29/2022     03/29/2022    MCV 91.5 03/29/2022    MCH 31.1 03/29/2022    MCHC 34.0 03/29/2022    RDW 13.3 03/29/2022    LYMPHOPCT 7.4 03/29/2022    MONOPCT 4.7 03/29/2022    BASOPCT 0.1 03/29/2022    MONOSABS 0.67 03/29/2022    LYMPHSABS 1.06 03/29/2022    EOSABS 0.00 03/29/2022    BASOSABS 0.01 03/29/2022       CMP     Lab Results   Component Value Date     03/29/2022    K 4.0 03/29/2022    K 3.8 03/27/2022     03/29/2022    CO2 27 03/29/2022    BUN 16 03/29/2022    CREATININE 0.7 03/29/2022    GFRAA >60 03/29/2022    LABGLOM >60 03/29/2022    GLUCOSE 148 03/29/2022    PROT 7.6 03/28/2022    LABALBU 4.5 03/28/2022    CALCIUM 9.2 03/29/2022    BILITOT 0.5 03/28/2022    ALKPHOS 54 03/28/2022    AST 17 03/28/2022    ALT 36 03/28/2022         Hepatic Function Panel:    Lab Results   Component Value Date    ALKPHOS 54 03/28/2022    ALT 36 03/28/2022    AST 17 03/28/2022    PROT 7.6 03/28/2022    BILITOT 0.5 03/28/2022    LABALBU 4.5 03/28/2022       PT/INR:  No results found for: PROTIME, INR    TSH:    Lab Results   Component Value Date    TSH 0.410 03/28/2022       U/A:  No results found for: NITRITE, COLORU, PHUR, LABCAST, WBCUA, RBCUA, MUCUS, TRICHOMONAS, YEAST, BACTERIA, CLARITYU, SPECGRAV, LEUKOCYTESUR, UROBILINOGEN, BILIRUBINUR, BLOODU, GLUCOSEU, AMORPHOUS    ABG:  No results found for: WVF5OLG, BEART, B4BJAKCW, PHART, THGBART, JSF8XLK, PO2ART, TIR6FER    MICROBIOLOGY:    Blood culture - negative       Radiology :    CT face -           1. Findings consistent with extensive facial cellulitis over the maxilla,   extending across the midline but more to the left and extending up to the   level of the orbits with left orbital preseptal cellulitis. 2. No evidence of an abscess.    3. No evidence of intra orbital abscess or postseptal cellulitis. 4. Poor visualization of the supraglottic structures including the   epiglottis, valleculae, piriform sinuses and aryepiglottic folds.  The   significance of this is uncertain and may be artifactual.  The possibility of   inflammatory process extending to the supraglottic region cannot be excluded.              IMPRESSION:     1. Facial cellulitis / preseptal cellulitis - improving   2. Leukocytosis       RECOMMENDATIONS:      1. Stop unasyn   2.  Oral augmentin XR 2 grams BID for 10 days   OK to discharge from [facility]

## 2022-03-29 NOTE — PROGRESS NOTES
SIGN OFF [address] [place] [address]  no longer has an active consult to dose and follow vancomycin. [address] signing off this patient.  Please re-consult pharmacy if future dosing is needed.     Thank you for the consult,    Lawrence Trujillo, PharmD 3/29/2022 8:17 AM

## 2022-04-01 LAB
BLOOD CULTURE, ROUTINE: NORMAL
CULTURE, BLOOD 2: NORMAL

## 2022-04-06 ENCOUNTER — TELEPHONE (OUTPATIENT)
Dept: SLEEP CENTER | Age: 37
End: 2022-04-06

## 2022-12-15 ENCOUNTER — HOSPITAL ENCOUNTER (OUTPATIENT)
Dept: SLEEP CENTER | Age: 37
Discharge: HOME | End: 2022-12-15

## 2022-12-15 DIAGNOSIS — G47.33 OSA (OBSTRUCTIVE SLEEP APNEA): Primary | ICD-10-CM

## 2023-09-04 ENCOUNTER — APPOINTMENT (OUTPATIENT)
Dept: GENERAL RADIOLOGY | Age: 38
End: 2023-09-04
Payer: COMMERCIAL

## 2023-09-04 ENCOUNTER — HOSPITAL ENCOUNTER (EMERGENCY)
Age: 38
Discharge: HOME | End: 2023-09-04
Payer: COMMERCIAL

## 2023-09-04 VITALS
DIASTOLIC BLOOD PRESSURE: 98 MMHG | SYSTOLIC BLOOD PRESSURE: 143 MMHG | HEART RATE: 104 BPM | BODY MASS INDEX: 42.98 KG/M2 | RESPIRATION RATE: 18 BRPM | OXYGEN SATURATION: 100 % | HEIGHT: 71 IN | WEIGHT: 307 LBS | TEMPERATURE: 98.2 F

## 2023-09-04 DIAGNOSIS — S83.92XA SPRAIN OF LEFT KNEE, UNSPECIFIED LIGAMENT, INITIAL ENCOUNTER: Primary | ICD-10-CM

## 2023-09-04 PROCEDURE — 73560 X-RAY EXAM OF KNEE 1 OR 2: CPT

## 2023-09-04 PROCEDURE — 99211 OFF/OP EST MAY X REQ PHY/QHP: CPT

## 2023-09-04 RX ORDER — LISINOPRIL 10 MG/1
10 TABLET ORAL DAILY
COMMUNITY

## 2023-09-04 ASSESSMENT — PAIN - FUNCTIONAL ASSESSMENT: PAIN_FUNCTIONAL_ASSESSMENT: NONE - DENIES PAIN

## 2025-05-15 LAB
APPEARANCE: ABNORMAL
CLOT CHECK: ABNORMAL
COLOR FLUID: YELLOW
MONO/MACROPHAGE FLUID: 7 %
NEUTROPHIL, FLUID: 93 %
RBC, SYNOVIAL FLUID: <2000 CELLS/UL
SPECIMEN TYPE: ABNORMAL
WBC COUNT, SYNOVIAL FLUID: ABNORMAL CELLS/UL

## 2025-05-16 LAB
CRYSTALS, FLUID: ABNORMAL
CULTURE: NORMAL
DIRECT EXAM: NORMAL
Lab: ABNORMAL
PATHOLOGIST REVIEW: ABNORMAL
SPECIMEN DESCRIPTION: NORMAL

## 2025-05-20 LAB
CULTURE: NO GROWTH
CULTURE: NORMAL
DIRECT EXAM: NORMAL
SPECIMEN DESCRIPTION: NORMAL
SPECIMEN DESCRIPTION: NORMAL